# Patient Record
Sex: MALE | Race: WHITE | URBAN - METROPOLITAN AREA
[De-identification: names, ages, dates, MRNs, and addresses within clinical notes are randomized per-mention and may not be internally consistent; named-entity substitution may affect disease eponyms.]

---

## 2018-02-25 ENCOUNTER — APPOINTMENT (OUTPATIENT)
Dept: GENERAL RADIOLOGY | Age: 23
End: 2018-02-25
Attending: EMERGENCY MEDICINE
Payer: COMMERCIAL

## 2018-02-25 ENCOUNTER — HOSPITAL ENCOUNTER (EMERGENCY)
Age: 23
Discharge: HOME | End: 2018-02-25
Attending: EMERGENCY MEDICINE | Admitting: EMERGENCY MEDICINE
Payer: COMMERCIAL

## 2018-02-25 VITALS
WEIGHT: 188.49 LBS | HEIGHT: 74 IN | BODY MASS INDEX: 24.19 KG/M2 | RESPIRATION RATE: 18 BRPM | DIASTOLIC BLOOD PRESSURE: 56 MMHG | TEMPERATURE: 98.3 F | OXYGEN SATURATION: 97 % | HEART RATE: 65 BPM | SYSTOLIC BLOOD PRESSURE: 154 MMHG

## 2018-02-25 DIAGNOSIS — R07.9 ACUTE CHEST PAIN: Primary | ICD-10-CM

## 2018-02-25 LAB
ALBUMIN SERPL-MCNC: 4.5 G/DL (ref 3.5–5)
ALBUMIN/GLOB SERPL: 1.6 {RATIO} (ref 1.1–2.2)
ALP SERPL-CCNC: 73 U/L (ref 45–117)
ALT SERPL-CCNC: 59 U/L (ref 12–78)
ANION GAP SERPL CALC-SCNC: 7 MMOL/L (ref 5–15)
AST SERPL-CCNC: 45 U/L (ref 15–37)
BASOPHILS # BLD: 0 K/UL (ref 0–0.1)
BASOPHILS NFR BLD: 0 % (ref 0–1)
BILIRUB SERPL-MCNC: 0.4 MG/DL (ref 0.2–1)
BUN SERPL-MCNC: 17 MG/DL (ref 6–20)
BUN/CREAT SERPL: 16 (ref 12–20)
CALCIUM SERPL-MCNC: 9.2 MG/DL (ref 8.5–10.1)
CHLORIDE SERPL-SCNC: 102 MMOL/L (ref 97–108)
CO2 SERPL-SCNC: 30 MMOL/L (ref 21–32)
CREAT SERPL-MCNC: 1.07 MG/DL (ref 0.7–1.3)
D DIMER PPP FEU-MCNC: <0.17 MG/L FEU (ref 0–0.65)
DIFFERENTIAL METHOD BLD: NORMAL
EOSINOPHIL # BLD: 0.2 K/UL (ref 0–0.4)
EOSINOPHIL NFR BLD: 2 % (ref 0–7)
ERYTHROCYTE [DISTWIDTH] IN BLOOD BY AUTOMATED COUNT: 12.8 % (ref 11.5–14.5)
GLOBULIN SER CALC-MCNC: 2.9 G/DL (ref 2–4)
GLUCOSE SERPL-MCNC: 98 MG/DL (ref 65–100)
HCT VFR BLD AUTO: 45.8 % (ref 36.6–50.3)
HGB BLD-MCNC: 16.2 G/DL (ref 12.1–17)
LYMPHOCYTES # BLD: 2.8 K/UL
LYMPHOCYTES NFR BLD: 45 % (ref 12–49)
MCH RBC QN AUTO: 30.8 PG (ref 26–34)
MCHC RBC AUTO-ENTMCNC: 35.4 G/DL (ref 30–36.5)
MCV RBC AUTO: 87.1 FL (ref 80–99)
MONOCYTES # BLD: 0.8 K/UL (ref 0–1)
MONOCYTES NFR BLD: 13 % (ref 5–13)
NEUTS SEG # BLD: 2.6 K/UL (ref 1.8–8)
NEUTS SEG NFR BLD: 40 % (ref 32–75)
PLATELET # BLD AUTO: 200 K/UL (ref 150–400)
PMV BLD AUTO: 10 FL (ref 8.9–12.9)
POTASSIUM SERPL-SCNC: 3.7 MMOL/L (ref 3.5–5.1)
PROT SERPL-MCNC: 7.4 G/DL (ref 6.4–8.2)
RBC # BLD AUTO: 5.26 M/UL (ref 4.1–5.7)
SODIUM SERPL-SCNC: 139 MMOL/L (ref 136–145)
WBC # BLD AUTO: 6.4 K/UL (ref 4.1–11.1)
XXWBCSUS: 0

## 2018-02-25 PROCEDURE — 85025 COMPLETE CBC W/AUTO DIFF WBC: CPT | Performed by: EMERGENCY MEDICINE

## 2018-02-25 PROCEDURE — 85379 FIBRIN DEGRADATION QUANT: CPT | Performed by: EMERGENCY MEDICINE

## 2018-02-25 PROCEDURE — 36415 COLL VENOUS BLD VENIPUNCTURE: CPT | Performed by: EMERGENCY MEDICINE

## 2018-02-25 PROCEDURE — 80053 COMPREHEN METABOLIC PANEL: CPT | Performed by: EMERGENCY MEDICINE

## 2018-02-25 PROCEDURE — 99283 EMERGENCY DEPT VISIT LOW MDM: CPT

## 2018-02-25 PROCEDURE — 71046 X-RAY EXAM CHEST 2 VIEWS: CPT

## 2018-02-25 RX ORDER — CALCIUM CARBONATE 200(500)MG
1 TABLET,CHEWABLE ORAL DAILY
COMMUNITY
End: 2018-05-25

## 2018-02-25 RX ORDER — IBUPROFEN 200 MG
600 TABLET ORAL
COMMUNITY
End: 2018-02-25

## 2018-02-25 RX ORDER — IBUPROFEN 200 MG
600 TABLET ORAL
Status: SHIPPED | COMMUNITY
Start: 2018-02-25 | End: 2018-05-25

## 2018-02-25 RX ORDER — KETOROLAC TROMETHAMINE 30 MG/ML
30 INJECTION, SOLUTION INTRAMUSCULAR; INTRAVENOUS
Status: DISCONTINUED | OUTPATIENT
Start: 2018-02-25 | End: 2018-02-26 | Stop reason: HOSPADM

## 2018-02-26 NOTE — DISCHARGE INSTRUCTIONS
Musculoskeletal Chest Pain: Care Instructions  Your Care Instructions    Chest pain is not always a sign that something is wrong with your heart or that you have another serious problem. The doctor thinks your chest pain is caused by strained muscles or ligaments, inflamed chest cartilage, or another problem in your chest, rather than by your heart. You may need more tests to find the cause of your chest pain. Follow-up care is a key part of your treatment and safety. Be sure to make and go to all appointments, and call your doctor if you are having problems. It's also a good idea to know your test results and keep a list of the medicines you take. How can you care for yourself at home? · Take pain medicines exactly as directed. ¨ If the doctor gave you a prescription medicine for pain, take it as prescribed. ¨ If you are not taking a prescription pain medicine, ask your doctor if you can take an over-the-counter medicine. · Rest and protect the sore area. · Stop, change, or take a break from any activity that may be causing your pain or soreness. · Put ice or a cold pack on the sore area for 10 to 20 minutes at a time. Try to do this every 1 to 2 hours for the next 3 days (when you are awake) or until the swelling goes down. Put a thin cloth between the ice and your skin. · After 2 or 3 days, apply a heating pad set on low or a warm cloth to the area that hurts. Some doctors suggest that you go back and forth between hot and cold. · Do not wrap or tape your ribs for support. This may cause you to take smaller breaths, which could increase your risk of lung problems. · Mentholated creams such as Bengay or Icy Hot may soothe sore muscles. Follow the instructions on the package. · Follow your doctor's instructions for exercising. · Gentle stretching and massage may help you get better faster. Stretch slowly to the point just before pain begins, and hold the stretch for at least 15 to 30 seconds.  Do this 3 or 4 times a day. Stretch just after you have applied heat. · As your pain gets better, slowly return to your normal activities. Any increased pain may be a sign that you need to rest a while longer. When should you call for help? Call 911 anytime you think you may need emergency care. For example, call if:  ? · You have chest pain or pressure. This may occur with:  ¨ Sweating. ¨ Shortness of breath. ¨ Nausea or vomiting. ¨ Pain that spreads from the chest to the neck, jaw, or one or both shoulders or arms. ¨ Dizziness or lightheadedness. ¨ A fast or uneven pulse. After calling 911, chew 1 adult-strength aspirin. Wait for an ambulance. Do not try to drive yourself. ? · You have sudden chest pain and shortness of breath, or you cough up blood. ?Call your doctor now or seek immediate medical care if:  ? · You have any trouble breathing. ? · Your chest pain gets worse. ? · Your chest pain occurs consistently with exercise and is relieved by rest.   ? Watch closely for changes in your health, and be sure to contact your doctor if:  ? · Your chest pain does not get better after 1 week. Where can you learn more? Go to http://sophie-rebecca.info/. Enter V293 in the search box to learn more about \"Musculoskeletal Chest Pain: Care Instructions. \"  Current as of: March 20, 2017  Content Version: 11.4  © 7876-2960 [employer]. Care instructions adapted under license by [facility] (which disclaims liability or warranty for this information). If you have questions about a medical condition or this instruction, always ask your healthcare professional. [place] [address] any warranty or liability for your use of this information. Chest Pain: Care Instructions  Your Care Instructions    There are many things that can cause chest pain. Some are not serious and will get better on their own in a few days.  But some kinds of chest pain need more testing and treatment. Your doctor may have recommended a follow-up visit in the next 8 to 12 hours. If you are not getting better, you may need more tests or treatment. Even though your doctor has released you, you still need to watch for any problems. The doctor carefully checked you, but sometimes problems can develop later. If you have new symptoms or if your symptoms do not get better, get medical care right away. If you have worse or different chest pain or pressure that lasts more than 5 minutes or you passed out (lost consciousness), call 911 or seek other emergency help right away. A medical visit is only one step in your treatment. Even if you feel better, you still need to do what your doctor recommends, such as going to all suggested follow-up appointments and taking medicines exactly as directed. This will help you recover and help prevent future problems. How can you care for yourself at home? · Rest until you feel better. · Take your medicine exactly as prescribed. Call your doctor if you think you are having a problem with your medicine. · Do not drive after taking a prescription pain medicine. When should you call for help? Call 911 if:  ? · You passed out (lost consciousness). ? · You have severe difficulty breathing. ? · You have symptoms of a heart attack. These may include:  ¨ Chest pain or pressure, or a strange feeling in your chest.  ¨ Sweating. ¨ Shortness of breath. ¨ Nausea or vomiting. ¨ Pain, pressure, or a strange feeling in your back, neck, jaw, or upper belly or in one or both shoulders or arms. ¨ Lightheadedness or sudden weakness. ¨ A fast or irregular heartbeat. After you call 911, the  may tell you to chew 1 adult-strength or 2 to 4 low-dose aspirin. Wait for an ambulance. Do not try to drive yourself. ?Call your doctor today if:  ? · You have any trouble breathing. ? · Your chest pain gets worse.    ? · You are dizzy or lightheaded, or you feel like you may faint. ? · You are not getting better as expected. ? · You are having new or different chest pain. Where can you learn more? Go to http://sophie-rebecca.info/. Enter A120 in the search box to learn more about \"Chest Pain: Care Instructions. \"  Current as of: March 20, 2017  Content Version: 11.4  © 3236-2416 [employer]. Care instructions adapted under license by "[employer] (which disclaims liability or warranty for this information). If you have questions about a medical condition or this instruction, always ask your healthcare professional. [place] [address] any warranty or liability for your use of this information.

## 2018-02-26 NOTE — ED NOTES
AIDET communication provided and informed of purposeful rounding to include collaboration of entire care team; patient acknowledged understanding. Pt remains on continuous pulse ox and BP. Call bell within reach, will continue to monitor.

## 2018-02-26 NOTE — ED NOTES
Patient discharged home after receiving discharge instructions from MD.  Patient and mother voiced understanding and doesn't have any questions at this time. Patient in no distress at this time. Pt ambulated out of the ER with his mother.

## 2018-02-26 NOTE — ED NOTES
Pt refuses Toradol. States he has a really high pain tolerance and doesn't want any meds at this time. He will let this nurse know if he needs anything.

## 2018-02-26 NOTE — ED TRIAGE NOTES
Pain to right rib cage when breathing in. Happened initially after taking a deep breath. No cough or injury. No SOB. No recent sickness or cough. Mom at bedside.  advil and tums given per mom, which hasn't helped

## 2018-02-26 NOTE — ED PROVIDER NOTES
HPI Comments: Ninoska Green is a 26 yo M with no significant medical history who presents to the ED with pleuritic right sided chest pain. He states that about an hour prior to arrival, he was laying down and took a deep breath and suddenly felt a sharp pain in his right inferior lateral chest.  Since then he has continued to have pain with moderately deep inspiration. He took ibuprofen for the pain but it did not help. He denies shortness of breath. He denies leg swelling. History reviewed. No pertinent past medical history. History reviewed. No pertinent surgical history. History reviewed. No pertinent family history. Social History     Social History    Marital status: SINGLE     Spouse name: N/A    Number of children: N/A    Years of education: N/A     Occupational History    Not on file. Social History Main Topics    Smoking status: Current Some Day Smoker    Smokeless tobacco: Never Used    Alcohol use No    Drug use: Yes     Special: Marijuana, Cocaine, Other      Comment: LSD also. last THc was about 2 weeks ago    Sexual activity: Not on file     Other Topics Concern    Not on file     Social History Narrative    No narrative on file         ALLERGIES: Review of patient's allergies indicates no known allergies. Review of Systems   Constitutional: Negative for fever. HENT: Negative for sore throat. Eyes: Negative for visual disturbance. Respiratory: Negative for cough and shortness of breath. Cardiovascular: Positive for chest pain. Gastrointestinal: Negative for abdominal pain. Genitourinary: Negative for dysuria. Musculoskeletal: Negative for back pain. Skin: Negative for rash. Neurological: Negative for headaches.        Vitals:    02/25/18 2108 02/25/18 2130 02/25/18 2200   BP: 138/87 165/85 154/56   Pulse: 81 80 65   Resp: 17 16 18   Temp: 98.3 °F (36.8 °C)     SpO2: 96% 90% 97%   Weight: 85.5 kg (188 lb 7.9 oz)     Height: 6' 2\" (1.88 m) Physical Exam   Constitutional: He appears well-developed and well-nourished. No distress. HENT:   Head: Normocephalic and atraumatic. Mouth/Throat: Oropharynx is clear and moist.   Eyes: Conjunctivae and EOM are normal.   Neck: Normal range of motion and phonation normal.   Cardiovascular: Normal rate and intact distal pulses. Pulmonary/Chest: Effort normal and breath sounds normal. No respiratory distress. He has no wheezes. He has no rales. He exhibits no tenderness. Abdominal: He exhibits no distension. Musculoskeletal: Normal range of motion. He exhibits no tenderness. Neurological: He is alert. He is not disoriented. He exhibits normal muscle tone. Skin: Skin is warm and dry. Nursing note and vitals reviewed. [facility]      ED Course     9:49 PM  CXR reviewed and is normal.  No pneumothorax. Labs drawn, results pending.     Procedures

## 2018-05-25 ENCOUNTER — OFFICE VISIT (OUTPATIENT)
Dept: CARDIOLOGY CLINIC | Age: 23
End: 2018-05-25

## 2018-05-25 VITALS
HEIGHT: 74 IN | BODY MASS INDEX: 23.66 KG/M2 | WEIGHT: 184.4 LBS | SYSTOLIC BLOOD PRESSURE: 118 MMHG | OXYGEN SATURATION: 99 % | DIASTOLIC BLOOD PRESSURE: 68 MMHG | HEART RATE: 51 BPM | RESPIRATION RATE: 18 BRPM

## 2018-05-25 DIAGNOSIS — I45.10 RIGHT BUNDLE BRANCH BLOCK: ICD-10-CM

## 2018-05-25 DIAGNOSIS — R00.1 SINUS BRADYCARDIA: ICD-10-CM

## 2018-05-25 DIAGNOSIS — R07.9 CHEST PAIN, UNSPECIFIED TYPE: Primary | ICD-10-CM

## 2018-05-25 NOTE — PROGRESS NOTES
Juliana Watson MD    [id number] [facility] [place], [address] [facility]    Office [phone],[facility] [phone]  Pager [phone]    Rasheeda Dempsey is a 21 y.o. male referred for evaluation of chest pain Consult requested by Dr Logan/ PF. Primary care physician:  None      Chief complaint:  Rasheeda Dempsey is a 21 y.o. male who complains of   Chief Complaint   Patient presents with    New Patient    Chest Pain (Angina)       Assessment:    Atypical chest pain  Abnormal EKG-sinus bradycardia/right bundle branch block      Plan:     Patient symptoms occurred at peak exercise-running at 12 mph on the treadmill with a heart rate in the 190s-200s. Once he stopped running, his pain resolved. He is pursuing rigorous training in the gym which includes cardiovascular/interval training of muscles. Will check echocardiogram.  If echo is normal, no further workup is needed. Patient is advised to monitor his symptoms . Discussed with patient about maximum predicted heart rate for his age. Patient will see me on a as needed basis if his echocardiogram is normal.    Patient understands the plan. All questions were answered to the patient's satisfaction. Medication Side Effects and Warnings were discussed with patient: yes  Patient Labs were reviewed and or requested:  yes  Patient Past Records were reviewed and or requested: yes    I appreciate the opportunity to be involved in Mr. Esquivel. Please see note below for details. Please do not hesitate to contact us with questions or concerns. Juliana Watson MD    Cardiac Testing/ Procedures: A. Cardiac Cath/PCI:    B.ECHO/TORI:    C.StressNuclear/Stress ECHO/Stress test:    D.Vascular:    E. EP:    F. Miscellaneous:    History of present illness:    Patient is a 80-year-old  male who had one episode of transient chest tightness at peak exercise. Patient symptoms occurred at peak exercise-running at 12 mph on the treadmill with a heart rate in the 190s-200s. Once he stopped running, his pain resolved. He is pursuing rigorous training in the gym which includes cardiovascular/interval training of muscles. 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 He works at [employer]. Past Medical History:   Diagnosis Date    Exercise-induced asthma       No past surgical history on file. No family history on file. Social History   Substance Use Topics    Smoking status: Former Smoker    Smokeless tobacco: Never Used    Alcohol use Yes     Has used cocaine/marijuana at parties previously but does not do it frequently. Medications before admission:    Current Outpatient Prescriptions   Medication Sig Dispense    BIOTIN PO Take  by mouth as needed. No current facility-administered medications for this visit. Review of Systems:  (bold if positive, if negative)    Gen:  Eyes:  ENT:  CVS:  Pulm:  GI:    :    MS:  Skin:  Psych:  Endo:    Hem:  Renal:    Neuro:        Physical Exam:  Visit Vitals    /68 (BP 1 Location: Left arm)    Pulse (!) 51    Resp 18    Ht 6' 2\" (1.88 m)    Wt 184 lb 6.4 oz (83.6 kg)    SpO2 99%    BMI 23.68 kg/m2          Gen: Well-developed, well-nourished, in no acute distress  HEENT:  Pink conjunctivae, hearing intact to voice, moist mucous membranes  Neck: Supple,No JVD, No Carotid Bruit, Thyroid- non tender  Resp: No accessory muscle use, Clear breath sounds, No rales or rhonchi  Card: Regular Rate,Rythm,Normal S1, S2, No murmurs, rubs or gallop. No thrills.    Abd:  Soft, non-tender, non-distended, normoactive bowel sounds are present,   MSK: No cyanosis or clubbing  Skin: No rashes or ulcers  Neuro:   moving all four extremities, no focal deficit, follows commands appropriately  Psych:  Good insight, oriented to person, place and time, alert, Nml Affect  LE: No edema  Vascular: Radial pulses 2+ and symmetric        EKG: Sinus bradycardia, right bundle branch block      Cxray:    LABS:    No results for input(s): CPK, TROIQ in the last 72 hours.     No lab exists for component: CKQMB, CPKMB    Lab Results   Component Value Date/Time    WBC 6.4 02/25/2018 09:42 PM    HGB 16.2 02/25/2018 09:42 PM    HCT 45.8 02/25/2018 09:42 PM    PLATELET 918 99/50/1975 09:42 PM    MCV 87.1 02/25/2018 09:42 PM     Lab Results   Component Value Date/Time    Sodium 139 02/25/2018 09:42 PM    Potassium 3.7 02/25/2018 09:42 PM    Chloride 102 02/25/2018 09:42 PM    CO2 30 02/25/2018 09:42 PM    Anion gap 7 02/25/2018 09:42 PM    Glucose 98 02/25/2018 09:42 PM    BUN 17 02/25/2018 09:42 PM    Creatinine 1.07 02/25/2018 09:42 PM    BUN/Creatinine ratio 16 02/25/2018 09:42 PM    GFR est AA >60 02/25/2018 09:42 PM    GFR est non-AA >60 02/25/2018 09:42 PM    Calcium 9.2 02/25/2018 09:42 PM             Alyse Mason MD

## 2018-05-25 NOTE — MR AVS SNAPSHOT
[address] [facility] [address] [facility] 
[phone] Patient: Jacki Courser MRN: [id number] :1995 Visit Information Date & Time Provider Department Dept. Phone Encounter #  
 2018  1:40 PM Jackie Dang MD [facility] Maribel Jaffe [phone] [id number] Your Appointments 2018  8:00 AM  
ECHO CARDIOGRAMS 2D with ECHOSHAMA  
[facility] (HERLINDA SCHEDULING) Appt Note: echo sll [address] [facility] [address] [facility]  
[address] [place] [address] [address] Upcoming Health Maintenance Date Due Pneumococcal 19-64 Medium Risk (1 of 1 - PPSV23) 2014 DTaP/Tdap/Td series (1 - Tdap) 2016 Influenza Age 5 to Adult 2018 Allergies as of 2018  Review Complete On: 2018 By: Elen Adame No Known Allergies Current Immunizations  Never Reviewed No immunizations on file. Not reviewed this visit You Were Diagnosed With   
  
 Codes Comments Chest pain, unspecified type    -  Primary ICD-10-CM: R07.9 ICD-9-CM: 786.50 Vitals BP Pulse Resp Height(growth percentile) Weight(growth percentile) SpO2  
 118/68 (BP 1 Location: Left arm) (!) 51 18 6' 2\" (1.88 m) 184 lb 6.4 oz (83.6 kg) 99% BMI Smoking Status 23.68 kg/m2 Former Smoker Vitals History BMI and BSA Data Body Mass Index Body Surface Area  
 23.68 kg/m 2 2.09 m 2 Your Updated Medication List  
  
   
This list is accurate as of 18  2:44 PM.  Always use your most recent med list.  
  
  
  
  
 BIOTIN PO Take  by mouth as needed. We Performed the Following AMB POC EKG ROUTINE W/ 12 LEADS, INTER & REP [73114 CPT(R)] Introducing [facility]! Dear Ainsley Seed: Thank you for requesting a TrueL[employer] account. Our records indicate that you already have an active TrueLens account. You can access your account anytime at [email or web address] Did you know that you can access your hospital and ER discharge instructions at any time in TrueLens? You can also review all of your test results from your hospital stay or ER visit. Additional Information If you have questions, please visit the Frequently Asked Questions section of the TrueLens website at [email or web address] Remember, TrueLens is NOT to be used for urgent needs. For medical emergencies, dial 911. Now available from your iPhone and Android! Please provide this summary of care documentation to your next provider. Your primary care clinician is listed as NONE. If you have any questions after today's visit, please call [phone].

## 2018-11-02 ENCOUNTER — HOSPITAL ENCOUNTER (OUTPATIENT)
Dept: MRI IMAGING | Age: 23
Discharge: HOME | End: 2018-11-02
Payer: COMMERCIAL

## 2018-11-02 DIAGNOSIS — S63.511A SPRAIN OF CARPAL JOINT OF RIGHT WRIST, INITIAL ENCOUNTER: ICD-10-CM

## 2018-11-02 PROCEDURE — 73221 MRI JOINT UPR EXTREM W/O DYE: CPT

## 2021-01-03 NOTE — PROGRESS NOTES
Assessment and Plan   Diagnoses and all orders for this visit:    1. Severe episode of recurrent major depressive disorder, without psychotic features ([facility]  -     escitalopram oxalate (LEXAPRO) 10 mg tablet; Take 1 Tab by mouth daily. 2. Anxiety  -     TSH 3RD GENERATION; Future  -     T4, FREE; Future  Reports severe depression symptoms associated with anxiety. Has also noted increased paranoia about \"literally everything. \"  Has thoughts about people talking about him behind his back or people thinking he is a suspicious person. Reports he had his friends have a \"bad history\" with law enforcement and is suspicious of undercover . Reports that his \"thoughts are illogical because there is no concrete evidence. \"  Reports having a negative view of the world. Denies any visual hallucinations. Reports a history of auditory hallucinations but thinks these may be related to drug use in the past which includes cocaine, marijuana, LSD, mushrooms. He also previously drank 1215 drinks a week of alcohol. He has not had any alcohol or recreational drugs since October 2019. Reports that his depressive symptoms have gotten worse after stopping substance use. Denies any episodes of [place] but reports his mom says he is \"up-and-down\" a lot. Patient interested in psychiatry counseling referral.  PHQ-9 score is 12. Recommend starting Lexapro for depression and anxiety. List of places provided to patient for psychiatry and counseling. Also recommend calling his insurance to get a list of covered providers. 3. Pain in left testicle  -     REFERRAL TO UROLOGY  Was previously scheduled to have surgery for varicose vein of his left scrotum last year. However, this did not occur due to his moved from [address] [place] [address]. Occasionally still has pain in his left scrotum. Referral provided for urology    4. Attention deficit hyperactivity disorder (ADHD), unspecified ADHD type  Diagnosed as a child.   Not currently on any medications. Continue to monitor    5. Exercise-induced asthma  Uses albuterol as needed before exercising. Well-controlled    6. Routine adult health maintenance  -     CBC WITH AUTOMATED DIFF; Future  -     HEMOGLOBIN A1C WITH EAG; Future  -     METABOLIC PANEL, COMPREHENSIVE; Future  -     LIPID PANEL; Future  Thinks that he already got the flu shot and Tdap vaccine. He will check with his previous provider in [place] [address], risks, possible drug interactions, and side effects of all new medications were reviewed with the patient. Pt verbalized understanding. Return to clinic: 4 to 6 weeks for medication follow-up    An electronic signature was used to authenticate this note. David Cramer MD  [facility]  1/4/2021    Future Appointments   Date Time Provider Abad Strong   3/1/3824  2:05 AM Glen Mendieta MD [facility] BS AMB        History of Present Illness   Chief Complaint   Establish care    Kwasi Reyes is a 22 y.o. male         Review of Systems   Constitutional: Negative for chills and fever. HENT: Negative for hearing loss. Eyes: Negative for blurred vision. Respiratory: Negative for shortness of breath. Cardiovascular: Negative for chest pain. Gastrointestinal: Negative for abdominal pain, blood in stool, constipation, diarrhea, melena, nausea and vomiting. Genitourinary: Negative for dysuria and hematuria. Musculoskeletal: Negative for joint pain. Skin: Negative for rash. Neurological: Negative for headaches. Past Medical History     Allergies   Allergen Reactions    Oxycodone Nausea and Vomiting        Current Outpatient Medications   Medication Sig    escitalopram oxalate (LEXAPRO) 10 mg tablet Take 1 Tab by mouth daily.  albuterol (PROVENTIL HFA, VENTOLIN HFA, PROAIR HFA) 90 mcg/actuation inhaler Take  by inhalation. No current facility-administered medications for this visit. Patient Active Problem List   Diagnosis Code    Severe episode of recurrent major depressive disorder, without psychotic features ([facility] F33.2    Anxiety F41.9    ADHD F90.9    Exercise-induced asthma J45.990     Past Surgical History:   Procedure Laterality Date    HX ORTHOPAEDIC      right wrist repair (tendon or ligament?)      Social History     Tobacco Use    Smoking status: Former Smoker    Smokeless tobacco: Never Used    Tobacco comment: 1ppd from age [age], quit in 2020   Substance Use Topics    Alcohol use: Not Currently     Comment: none since 10/2019; 12-15 drinks/week previously      Family History   Family history unknown: Yes        Physical Exam   Vitals:       Visit Vitals  /85 (BP 1 Location: Left arm, BP Patient Position: Sitting)   Pulse 71   Temp 98.3 °F (36.8 °C) (Oral)   Resp 16   Ht 6' 2\" (1.88 m)   Wt 188 lb (85.3 kg)   SpO2 95%   BMI 24.14 kg/m²        Physical Exam  Constitutional:       General: He is not in acute distress. Appearance: He is well-developed. HENT:      Right Ear: Tympanic membrane, ear canal and external ear normal.      Left Ear: Tympanic membrane, ear canal and external ear normal.   Eyes:      Extraocular Movements: Extraocular movements intact. Conjunctiva/sclera: Conjunctivae normal.   Neck:      Musculoskeletal: Neck supple. Cardiovascular:      Rate and Rhythm: Normal rate and regular rhythm. Pulses: Normal pulses. Heart sounds: No murmur. No friction rub. No gallop. Pulmonary:      Effort: No respiratory distress. Breath sounds: No wheezing, rhonchi or rales. Abdominal:      General: Bowel sounds are normal. There is no distension. Palpations: Abdomen is soft. There is no hepatomegaly, splenomegaly or mass. Tenderness: There is no abdominal tenderness. There is no guarding. Skin:     General: Skin is warm. Findings: No rash. Neurological:      Mental Status: He is alert.    Psychiatric: Mood and Affect: Mood normal.         Behavior: Behavior normal.         Thought Content:  Thought content normal.         Judgment: Judgment normal.

## 2021-01-04 ENCOUNTER — OFFICE VISIT (OUTPATIENT)
Dept: INTERNAL MEDICINE CLINIC | Age: 26
End: 2021-01-04
Payer: MEDICAID

## 2021-01-04 VITALS
WEIGHT: 188 LBS | TEMPERATURE: 98.3 F | BODY MASS INDEX: 24.13 KG/M2 | HEIGHT: 74 IN | HEART RATE: 71 BPM | OXYGEN SATURATION: 95 % | RESPIRATION RATE: 16 BRPM | SYSTOLIC BLOOD PRESSURE: 126 MMHG | DIASTOLIC BLOOD PRESSURE: 85 MMHG

## 2021-01-04 DIAGNOSIS — F33.2 SEVERE EPISODE OF RECURRENT MAJOR DEPRESSIVE DISORDER, WITHOUT PSYCHOTIC FEATURES (HCC): Primary | ICD-10-CM

## 2021-01-04 DIAGNOSIS — J45.990 EXERCISE-INDUCED ASTHMA: ICD-10-CM

## 2021-01-04 DIAGNOSIS — F41.9 ANXIETY: ICD-10-CM

## 2021-01-04 DIAGNOSIS — Z00.00 ROUTINE ADULT HEALTH MAINTENANCE: ICD-10-CM

## 2021-01-04 DIAGNOSIS — F90.9 ATTENTION DEFICIT HYPERACTIVITY DISORDER (ADHD), UNSPECIFIED ADHD TYPE: ICD-10-CM

## 2021-01-04 DIAGNOSIS — N50.812 PAIN IN LEFT TESTICLE: ICD-10-CM

## 2021-01-04 PROCEDURE — 99204 OFFICE O/P NEW MOD 45 MIN: CPT | Performed by: INTERNAL MEDICINE

## 2021-01-04 RX ORDER — ESCITALOPRAM OXALATE 10 MG/1
10 TABLET ORAL DAILY
Qty: 30 TAB | Refills: 1 | OUTPATIENT
Start: 2021-01-04 | End: 2021-01-20

## 2021-01-04 RX ORDER — ALBUTEROL SULFATE 90 UG/1
2 AEROSOL, METERED RESPIRATORY (INHALATION)
COMMUNITY
End: 2021-07-06 | Stop reason: SDUPTHER

## 2021-01-04 NOTE — PROGRESS NOTES
Flower Ruggiero is a 22 y.o. male    Chief Complaint   Patient presents with   [address]     1. Have you been to the ER, urgent care clinic since your last visit? Hospitalized since your last visit? No      2. Have you seen or consulted any other health care providers outside of the [address] [place] [address] since your last visit? Include any pap smears or colon screening.  No

## 2021-01-04 NOTE — PATIENT INSTRUCTIONS
[facility] [address] [address] [facility] SPECIALTY \[facility]\"" Group Yuri Humphreys 8 [facility] [phone] [address] [facility] [address] [facility] 
[phone] [facility] [address] [phone] [address] [facility], 108 [facility] [phone] [facility] Abdulkadir Boudreaux MD 
53 [facility] [address] [place] [phone] [address] Jim Face, DO 
1000 Anam Arellano [facility] 
[phone]

## 2021-01-05 LAB
ALBUMIN SERPL-MCNC: 4.7 G/DL (ref 3.5–5)
ALBUMIN/GLOB SERPL: 1.6 {RATIO} (ref 1.1–2.2)
ALP SERPL-CCNC: 76 U/L (ref 45–117)
ALT SERPL-CCNC: 37 U/L (ref 12–78)
ANION GAP SERPL CALC-SCNC: 6 MMOL/L (ref 5–15)
AST SERPL-CCNC: 21 U/L (ref 15–37)
BASOPHILS # BLD: 0 K/UL (ref 0–0.1)
BASOPHILS NFR BLD: 1 % (ref 0–1)
BILIRUB SERPL-MCNC: 0.7 MG/DL (ref 0.2–1)
BUN SERPL-MCNC: 18 MG/DL (ref 6–20)
BUN/CREAT SERPL: 16 (ref 12–20)
CALCIUM SERPL-MCNC: 9 MG/DL (ref 8.5–10.1)
CHLORIDE SERPL-SCNC: 106 MMOL/L (ref 97–108)
CHOLEST SERPL-MCNC: 200 MG/DL
CO2 SERPL-SCNC: 29 MMOL/L (ref 21–32)
CREAT SERPL-MCNC: 1.13 MG/DL (ref 0.7–1.3)
DIFFERENTIAL METHOD BLD: NORMAL
EOSINOPHIL # BLD: 0.2 K/UL (ref 0–0.4)
EOSINOPHIL NFR BLD: 3 % (ref 0–7)
ERYTHROCYTE [DISTWIDTH] IN BLOOD BY AUTOMATED COUNT: 12.1 % (ref 11.5–14.5)
EST. AVERAGE GLUCOSE BLD GHB EST-MCNC: 97 MG/DL
GLOBULIN SER CALC-MCNC: 2.9 G/DL (ref 2–4)
GLUCOSE SERPL-MCNC: 88 MG/DL (ref 65–100)
HBA1C MFR BLD: 5 % (ref 4–5.6)
HCT VFR BLD AUTO: 48.6 % (ref 36.6–50.3)
HDLC SERPL-MCNC: 55 MG/DL
HDLC SERPL: 3.6 {RATIO} (ref 0–5)
HGB BLD-MCNC: 16.4 G/DL (ref 12.1–17)
IMM GRANULOCYTES # BLD AUTO: 0 K/UL (ref 0–0.04)
IMM GRANULOCYTES NFR BLD AUTO: 0 % (ref 0–0.5)
LDLC SERPL CALC-MCNC: 135.6 MG/DL (ref 0–100)
LIPID PROFILE,FLP: ABNORMAL
LYMPHOCYTES # BLD: 1.9 K/UL (ref 0.8–3.5)
LYMPHOCYTES NFR BLD: 36 % (ref 12–49)
MCH RBC QN AUTO: 30.4 PG (ref 26–34)
MCHC RBC AUTO-ENTMCNC: 33.7 G/DL (ref 30–36.5)
MCV RBC AUTO: 90 FL (ref 80–99)
MONOCYTES # BLD: 0.5 K/UL (ref 0–1)
MONOCYTES NFR BLD: 9 % (ref 5–13)
NEUTS SEG # BLD: 2.7 K/UL (ref 1.8–8)
NEUTS SEG NFR BLD: 51 % (ref 32–75)
NRBC # BLD: 0 K/UL (ref 0–0.01)
NRBC BLD-RTO: 0 PER 100 WBC
PLATELET # BLD AUTO: 237 K/UL (ref 150–400)
PMV BLD AUTO: 10.9 FL (ref 8.9–12.9)
POTASSIUM SERPL-SCNC: 4.7 MMOL/L (ref 3.5–5.1)
PROT SERPL-MCNC: 7.6 G/DL (ref 6.4–8.2)
RBC # BLD AUTO: 5.4 M/UL (ref 4.1–5.7)
SODIUM SERPL-SCNC: 141 MMOL/L (ref 136–145)
T4 FREE SERPL-MCNC: 1.3 NG/DL (ref 0.8–1.5)
TRIGL SERPL-MCNC: 47 MG/DL (ref ?–150)
TSH SERPL DL<=0.05 MIU/L-ACNC: 2.99 UIU/ML (ref 0.36–3.74)
VLDLC SERPL CALC-MCNC: 9.4 MG/DL
WBC # BLD AUTO: 5.3 K/UL (ref 4.1–11.1)

## 2021-01-05 NOTE — PROGRESS NOTES
ExpertBeacont message sent. Thyroid studies normal.  . CMP normal.  A1c normal.  CBC normal  Monitor lipid.

## 2021-01-20 ENCOUNTER — HOSPITAL ENCOUNTER (EMERGENCY)
Age: 26
Discharge: HOME | End: 2021-01-20
Attending: STUDENT IN AN ORGANIZED HEALTH CARE EDUCATION/TRAINING PROGRAM
Payer: MEDICAID

## 2021-01-20 VITALS
TEMPERATURE: 98.4 F | OXYGEN SATURATION: 96 % | RESPIRATION RATE: 16 BRPM | DIASTOLIC BLOOD PRESSURE: 65 MMHG | SYSTOLIC BLOOD PRESSURE: 119 MMHG | HEART RATE: 52 BPM

## 2021-01-20 DIAGNOSIS — R53.83 FATIGUE, UNSPECIFIED TYPE: ICD-10-CM

## 2021-01-20 DIAGNOSIS — F41.8 ANXIETY ASSOCIATED WITH DEPRESSION: Primary | ICD-10-CM

## 2021-01-20 LAB
ALBUMIN SERPL-MCNC: 4.1 G/DL (ref 3.5–5)
ALBUMIN/GLOB SERPL: 1.3 {RATIO} (ref 1.1–2.2)
ALP SERPL-CCNC: 76 U/L (ref 45–117)
ALT SERPL-CCNC: 35 U/L (ref 12–78)
ANION GAP SERPL CALC-SCNC: 5 MMOL/L (ref 5–15)
APPEARANCE UR: CLEAR
AST SERPL-CCNC: 17 U/L (ref 15–37)
BASOPHILS # BLD: 0 K/UL (ref 0–0.1)
BASOPHILS NFR BLD: 1 % (ref 0–1)
BILIRUB SERPL-MCNC: 0.3 MG/DL (ref 0.2–1)
BILIRUB UR QL: NEGATIVE
BUN SERPL-MCNC: 19 MG/DL (ref 6–20)
BUN/CREAT SERPL: 15 (ref 12–20)
CALCIUM SERPL-MCNC: 8.8 MG/DL (ref 8.5–10.1)
CHLORIDE SERPL-SCNC: 104 MMOL/L (ref 97–108)
CO2 SERPL-SCNC: 31 MMOL/L (ref 21–32)
COLOR UR: NORMAL
CREAT SERPL-MCNC: 1.29 MG/DL (ref 0.7–1.3)
DIFFERENTIAL METHOD BLD: ABNORMAL
EOSINOPHIL # BLD: 0.4 K/UL (ref 0–0.4)
EOSINOPHIL NFR BLD: 6 % (ref 0–7)
ERYTHROCYTE [DISTWIDTH] IN BLOOD BY AUTOMATED COUNT: 11.9 % (ref 11.5–14.5)
GLOBULIN SER CALC-MCNC: 3.1 G/DL (ref 2–4)
GLUCOSE SERPL-MCNC: 96 MG/DL (ref 65–100)
GLUCOSE UR STRIP.AUTO-MCNC: NEGATIVE MG/DL
HCT VFR BLD AUTO: 48.2 % (ref 36.6–50.3)
HGB BLD-MCNC: 16.2 G/DL (ref 12.1–17)
HGB UR QL STRIP: NEGATIVE
IMM GRANULOCYTES # BLD AUTO: 0 K/UL (ref 0–0.04)
IMM GRANULOCYTES NFR BLD AUTO: 0 % (ref 0–0.5)
KETONES UR QL STRIP.AUTO: NEGATIVE MG/DL
LEUKOCYTE ESTERASE UR QL STRIP.AUTO: NEGATIVE
LYMPHOCYTES # BLD: 2.9 K/UL (ref 0.8–3.5)
LYMPHOCYTES NFR BLD: 52 % (ref 12–49)
MAGNESIUM SERPL-MCNC: 2.1 MG/DL (ref 1.6–2.4)
MCH RBC QN AUTO: 29.9 PG (ref 26–34)
MCHC RBC AUTO-ENTMCNC: 33.6 G/DL (ref 30–36.5)
MCV RBC AUTO: 89.1 FL (ref 80–99)
MONOCYTES # BLD: 0.6 K/UL (ref 0–1)
MONOCYTES NFR BLD: 10 % (ref 5–13)
NEUTS SEG # BLD: 1.8 K/UL (ref 1.8–8)
NEUTS SEG NFR BLD: 32 % (ref 32–75)
NITRITE UR QL STRIP.AUTO: NEGATIVE
NRBC # BLD: 0 K/UL (ref 0–0.01)
NRBC BLD-RTO: 0 PER 100 WBC
PH UR STRIP: 7 [PH] (ref 5–8)
PLATELET # BLD AUTO: 202 K/UL (ref 150–400)
PMV BLD AUTO: 10.6 FL (ref 8.9–12.9)
POTASSIUM SERPL-SCNC: 4.3 MMOL/L (ref 3.5–5.1)
PROT SERPL-MCNC: 7.2 G/DL (ref 6.4–8.2)
PROT UR STRIP-MCNC: NEGATIVE MG/DL
RBC # BLD AUTO: 5.41 M/UL (ref 4.1–5.7)
SODIUM SERPL-SCNC: 140 MMOL/L (ref 136–145)
SP GR UR REFRACTOMETRY: 1.02 (ref 1–1.03)
UROBILINOGEN UR QL STRIP.AUTO: 0.2 EU/DL (ref 0.2–1)
WBC # BLD AUTO: 5.7 K/UL (ref 4.1–11.1)

## 2021-01-20 PROCEDURE — 83735 ASSAY OF MAGNESIUM: CPT

## 2021-01-20 PROCEDURE — 80053 COMPREHEN METABOLIC PANEL: CPT

## 2021-01-20 PROCEDURE — 36415 COLL VENOUS BLD VENIPUNCTURE: CPT

## 2021-01-20 PROCEDURE — 85025 COMPLETE CBC W/AUTO DIFF WBC: CPT

## 2021-01-20 PROCEDURE — 81003 URINALYSIS AUTO W/O SCOPE: CPT

## 2021-01-20 PROCEDURE — 99284 EMERGENCY DEPT VISIT MOD MDM: CPT

## 2021-01-20 NOTE — ED PROVIDER NOTES
The patient is a 49-year-old male history of depression/anxiety, started on Lexapro 2 weeks ago by his PCP, here today with fatigue and anxiety. Patient states for the past day or so he has had increasing fatigue. Other than the Lexapro he has had no new medications, although he mentions he took melatonin last night. He states that today he took his dose of Lexapro around noon and started feeling worse. Reports progressive fatigue as well as anxiety. He does note that he is got depression, potentially worsening since starting the medication, but denies any suicidal or homicidal ideations. Patient states that he has no energy or drive to do anything. He sleeps 13 to 14 hours a day. He has been eating and drinking. Denies any pain. No dizziness or focal weakness. No fevers or chills. No cough or shortness of breath. No urinary complaints. He was given resources by his PCP for psychiatry however they have been backlog with appointments therefore he is supposed to call them back tomorrow. He called the nursing hotline on his insurance card today because of the way he felt and they advised him to come to the emergency department.            Past Medical History:   Diagnosis Date    ADHD     Exercise-induced asthma     Psychiatric disorder     , depression anxiety       Past Surgical History:   Procedure Laterality Date    HX ORTHOPAEDIC      right wrist repair (tendon or ligament?)         Family History:   Family history unknown: Yes       Social History     Socioeconomic History    Marital status: SINGLE     Spouse name: Not on file    Number of children: Not on file    Years of education: Not on file    Highest education level: Not on file   Occupational History    Not on file   Social Needs    Financial resource strain: Not on file    Food insecurity     Worry: Not on file     Inability: Not on file    Transportation needs     Medical: Not on file     Non-medical: Not on file   Tobacco Use  Smoking status: Former Smoker    Smokeless tobacco: Never Used    Tobacco comment: 1ppd from age [age], quit in 2020   Substance and Sexual Activity    Alcohol use: Not Currently     Comment: none since 10/2019; 12-15 drinks/week previously    Drug use: Yes     Types: Marijuana, Cocaine, Other     Comment: LSD also, mushrooms, none since 10/2019    Sexual activity: Not Currently     Partners: Female   Lifestyle    Physical activity     Days per week: Not on file     Minutes per session: Not on file    Stress: Not on file   Relationships    Social connections     Talks on phone: Not on file     Gets together: Not on file     Attends Restorationist service: Not on file     Active member of club or organization: Not on file     Attends meetings of clubs or organizations: Not on file     Relationship status: Not on file    Intimate partner violence     Fear of current or ex partner: Not on file     Emotionally abused: Not on file     Physically abused: Not on file     Forced sexual activity: Not on file   Other Topics Concern    Not on file   Social History Narrative    Not on file         ALLERGIES: Oxycodone    Review of Systems   Constitutional: Positive for fatigue. Negative for chills and fever. HENT: Negative for congestion and sore throat. Eyes: Negative for pain and redness. Respiratory: Negative for cough and shortness of breath. Cardiovascular: Negative for chest pain and palpitations. Gastrointestinal: Negative for abdominal pain, diarrhea, nausea and vomiting. Genitourinary: Negative for frequency and hematuria. Musculoskeletal: Negative for back pain and neck pain. Skin: Negative for rash and wound. Neurological: Negative for dizziness and headaches. Hematological: Does not bruise/bleed easily. Psychiatric/Behavioral: Positive for dysphoric mood. Negative for suicidal ideas. The patient is nervous/anxious.         Vitals:    01/20/21 1703   BP: 139/72   Pulse: (!) 59   Resp: 16   Temp: 98.4 °F (36.9 °C)   SpO2: 96%   :         Physical Exam  Vitals signs and nursing note reviewed. Constitutional:       General: He is not in acute distress. Appearance: He is well-developed. HENT:      Head: Normocephalic and atraumatic. Eyes:      Conjunctiva/sclera: Conjunctivae normal.      Pupils: Pupils are equal, round, and reactive to light. Neck:      Musculoskeletal: Normal range of motion and neck supple. Cardiovascular:      Rate and Rhythm: Normal rate and regular rhythm. Heart sounds: Normal heart sounds. No murmur. No friction rub. No gallop. Pulmonary:      Effort: Pulmonary effort is normal. No respiratory distress. Breath sounds: Normal breath sounds. No wheezing or rales. Abdominal:      General: Bowel sounds are normal. There is no distension. Palpations: Abdomen is soft. Tenderness: There is no abdominal tenderness. There is no guarding or rebound. Musculoskeletal: Normal range of motion. Comments: No muscle rigidity   Skin:     General: Skin is warm and dry. Capillary Refill: Capillary refill takes less than 2 seconds. Findings: No rash. Neurological:      Mental Status: He is alert and oriented to person, place, and time. Psychiatric:      Comments: Very flat/withdrawn and depressed appearing  No active SI or HI  Does not appear internally stimulated         MDM:     22 y.o. male here with fatigue/anxiety and worsening depression despite starting lexapro 2 weeks ago. Is supposed to d/w psych over phone tomorrow but felt so weak today that he wanted to get evaluated. Patient not having active SI/HI. I have consulted bsmart to see the patient due to worsening depression. His labs overall look ok--no significant electrolyte abnormality, anemia. He has no pain/sob/cough/fever to suggest infection such as covid. He had his thyroid levels checked 2 weeks ago and they were ok.   I will have bsmart see the patient and then likely dc the patient home. 7:07 PM  Change of shift. Care of patient signed over to Dr. Pretty Bhardwaj. Bedside handoff complete. Awaiting BSMRT.

## 2021-01-20 NOTE — ED TRIAGE NOTES
Pt ambulated to the treatment area with a steady gait. Pt states \"I started Lexapro 2 weeks ago for anxiety and depression today when I woke up I felt very fatigued and anxious. I took the Lexapro around 12noon and I felt worse after that. Im not sure if its related but I still feel very fatigued and anxious. \" Pt appears in no distress at this time.

## 2021-01-20 NOTE — ED NOTES
Dr Yevgeniy Hu is talking on the phone to [facility] from [address] [place] [address] about care for this patient.

## 2021-01-21 ENCOUNTER — TELEPHONE (OUTPATIENT)
Dept: INTERNAL MEDICINE CLINIC | Age: 26
End: 2021-01-21

## 2021-01-21 ENCOUNTER — PATIENT OUTREACH (OUTPATIENT)
Dept: CASE MANAGEMENT | Age: 26
End: 2021-01-21

## 2021-01-21 NOTE — ED NOTES
Bedside shift change report given to [facility], RN  (oncoming nurse) by TIAGO RN  (offgoing nurse). Report included the following information SBAR.

## 2021-01-21 NOTE — TELEPHONE ENCOUNTER
----- Message from Live Shirley sent at 1/21/2021  1:00 PM EST -----  Regarding: Telephone  General Message/Vendor Calls    Caller's first and last name: Blake Foreman       Reason for call: [facility]  fatugued, anxious, almost to the point that he could move, the ER doctor recommended  he stops taking the Lexopro so he stopped taking the meds       Callback required yes/no and why: Yes if you all need to speak with him.        Best contact number(s):  [phone]      Details to clarify the request: n/a      Live Shirley

## 2021-01-21 NOTE — TELEPHONE ENCOUNTER
Patient called to report being seen in ER for anxiety, depression, & fatigue. PSR scheduled appointment with PCP 1/22/2021 at 11:30AM. Patient request sooner appointment with PCP. If PCP is able to accomodates patients request please call patient to advise.      [phone]
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
## 2021-01-21 NOTE — ACP (ADVANCE CARE PLANNING)
Advance Care Planning:   Does patient have an Advance Directive: currently not on file; education provided  No changes to his medical agent-currently lists his mother Lelia Franco. no lymphadenopathy note

## 2021-01-21 NOTE — ED NOTES
Bedside shift change report given to [facility] RN (oncoming nurse) by Lisbeth grimm. Curtis Jeronimo RN (offgoing nurse). Report included the following information SBAR.

## 2021-01-21 NOTE — PROGRESS NOTES
Patient contacted regarding recent discharge and COVID-19 risk. Discussed COVID-19 related testing which was not done at this time. Test results were not done. Patient informed of results, if available? yes    Outreach made within 2 business days of discharge: Yes    Care Transition Nurse/ Ambulatory Care Manager/ LPN Care Coordinator contacted the patient by telephone to perform post discharge assessment. Verified name and  with patient as identifiers. Patient has following risk factors of: asthma. CTN/ACM/LPN reviewed discharge instructions, medical action plan and red flags related to discharge diagnosis. Reviewed and educated them on any new and changed medications related to discharge diagnosis. Advised obtaining a 90-day supply of all daily and as-needed medications. No new medications- reports he has stopped Lexapro. Advance Care Planning:   Does patient have an Advance Directive: currently not on file; education provided  No changes to his medical agent-currently lists his mother Ashwini Miller. Education provided regarding infection prevention, and signs and symptoms of COVID-19 and when to seek medical attention with patient who verbalized understanding. Discussed exposure protocols and quarantine from [address] you at higher risk for severe illness  and given an opportunity for questions and concerns. The patient agrees to contact the COVID-19 hotline [phone] or PCP office for questions related to their healthcare. CTN/ACM/LPN provided contact information for future reference. Patient has contacted PCP for follow up and is waiting for follow up from PCP office. [facility] contact information provided to patient. From CDC: Are you at higher risk for severe illness?  Wash your hands often.  Avoid close contact (6 feet, which is about two arm lengths) with people who are sick.    Put distance between yourself and other people if COVID-19 is spreading in your community.  Clean and disinfect frequently touched surfaces.  Avoid all cruise travel and non-essential air travel.  Call your healthcare professional if you have concerns about COVID-19 and your underlying condition or if you are sick. For more information on steps you can take to protect yourself, see CDC's How to Protect Yourself      Patient/family/caregiver given information for GetWell Loop and agrees to enroll yes  Patient's preferred e-mail:  n/a  Patient's preferred phone number: [phone]    Based on Loop alert triggers, patient will be contacted by nurse care manager for worsening symptoms. Pt will be further monitored by COVID Loop Team, pending account activation by patient.  based on severity of symptoms and risk factors.  DMB

## 2021-01-21 NOTE — ED NOTES
The patient was discharged home by Dr. Brianna Mcfadden and Beryl Swanson rn in stable condition. The patient is alert and oriented, is in no respiratory distress and has vital signs within normal limits . The patient's diagnosis, condition and treatment were explained to patient. The patient expressed understanding. No prescriptions given to pt. No work/school note given to pt. A discharge plan has been developed. A  was not involved in the process. Aftercare instructions were given to the patient. Pt's saline lock removed without complications. Pt will transport self home.

## 2021-01-21 NOTE — ED NOTES
7:11 PM  Change of shift. Care of patient taken over from Dr. Bradley Gerber; H&P reviewed, bedside handoff complete. Awaiting UA, KATIE evaluation. 9:02 PM  Discussed with BSMART, Duane. Patient does not meet criteria for admission. He discussed patient with Psychiatry NP, Cyndy Pimentel who advised patient could stop his Lexapro now and follow-up with his physician to switch to another medication. Faxed additional resources to be provided to patient.

## 2021-01-22 ENCOUNTER — OFFICE VISIT (OUTPATIENT)
Dept: INTERNAL MEDICINE CLINIC | Age: 26
End: 2021-01-22
Payer: MEDICAID

## 2021-01-22 VITALS
BODY MASS INDEX: 23.87 KG/M2 | HEIGHT: 74 IN | DIASTOLIC BLOOD PRESSURE: 83 MMHG | WEIGHT: 186 LBS | HEART RATE: 76 BPM | SYSTOLIC BLOOD PRESSURE: 130 MMHG | RESPIRATION RATE: 18 BRPM | OXYGEN SATURATION: 97 % | TEMPERATURE: 97.8 F

## 2021-01-22 DIAGNOSIS — F41.9 ANXIETY: ICD-10-CM

## 2021-01-22 DIAGNOSIS — F33.2 SEVERE EPISODE OF RECURRENT MAJOR DEPRESSIVE DISORDER, WITHOUT PSYCHOTIC FEATURES (HCC): Primary | ICD-10-CM

## 2021-01-22 PROCEDURE — 99214 OFFICE O/P EST MOD 30 MIN: CPT | Performed by: INTERNAL MEDICINE

## 2021-01-22 RX ORDER — BUPROPION HYDROCHLORIDE 150 MG/1
150 TABLET ORAL
Qty: 30 TAB | Refills: 1 | Status: SHIPPED | OUTPATIENT
Start: 2021-01-22 | End: 2021-02-05

## 2021-01-22 NOTE — PROGRESS NOTES
Assessment and Plan   Diagnoses and all orders for this visit:    1. Severe episode of recurrent major depressive disorder, without psychotic features ([facility] [address]  -     buPROPion XL (WELLBUTRIN XL) 150 mg tablet; Take 1 Tab by mouth every morning. Indications: anxiety and depression  2. Anxiety  From previous visit:  \"Reports severe depression symptoms associated with anxiety. Has also noted increased paranoia about \"literally everything. \"  Has thoughts about people talking about him behind his back or people thinking he is a suspicious person. Reports he had his friends have a \"bad history\" with law enforcement and is suspicious of undercover . Reports that his \"thoughts are illogical because there is no concrete evidence. \"  Reports having a negative view of the world. Denies any visual hallucinations. Reports a history of auditory hallucinations but thinks these may be related to drug use in the past which includes cocaine, marijuana, LSD, mushrooms. He also previously drank 1215 drinks a week of alcohol. He has not had any alcohol or recreational drugs since October 2019. Reports that his depressive symptoms have gotten worse after stopping substance use. Denies any episodes of [place] but reports his mom says he is \"up-and-down\" a lot. \"    Was started on Lexapro at this visit. However, 2 days ago, he woke up with severe anxiety and fatigue and weakness. He went to the emergency room. Blood work at that time was negative. He stopped taking Lexapro after this visit. Symptoms have somewhat improved although \"I still feel good. \"  Denies any fever, chills, cough, shortness of breath, palpitations, abdominal pain, nausea, vomiting, diarrhea, constipation. Denies any new stressors. Sleeps about 10-12 hours at night and feels well rested. Felt worse on Lexapro. Reports that he was previously on Wellbutrin and tolerated that medication well. He is interested in retrying this.   Denies a history of seizures or eating disorders. We will start Wellbutrin 150 mg daily. Given information for online counseling. He is supposed to call [facility] again to see if they have any openings for appointments. Benefits, risks, possible drug interactions, and side effects of all new medications were reviewed with the patient. Pt verbalized understanding. Return to clinic: 2 weeks for medication follow-up    An electronic signature was used to authenticate this note. Delisa Mccray MD  [facility]  1/22/2021    Future Appointments   Date Time Provider Abad Wallacei   9/0/8987  4:48 AM Karen Smalls MD [facility]        Subjective   Chief Complaint   Anxiety fatigue    Javy Lawrence is a 22 y.o. male           Objective   Vitals:       Visit Vitals  /83   Pulse 76   Temp 97.8 °F (36.6 °C)   Resp 18   Ht 6' 2\" (1.88 m)   Wt 186 lb (84.4 kg)   SpO2 97%   BMI 23.88 kg/m²        Physical Exam  Constitutional:       Appearance: Normal appearance. He is not ill-appearing. Cardiovascular:      Rate and Rhythm: Normal rate and regular rhythm. Heart sounds: No murmur. No friction rub. No gallop. Pulmonary:      Effort: No respiratory distress. Breath sounds: Normal breath sounds. No wheezing, rhonchi or rales. Neurological:      Mental Status: He is alert. Psychiatric:         Thought Content: Thought content normal.         Judgment: Judgment normal.      Comments: Flat affect          Current Outpatient Medications   Medication Sig    buPROPion XL (WELLBUTRIN XL) 150 mg tablet Take 1 Tab by mouth every morning. Indications: anxiety and depression    albuterol (PROVENTIL HFA, VENTOLIN HFA, PROAIR HFA) 90 mcg/actuation inhaler Take  by inhalation. No current facility-administered medications for this visit.

## 2021-02-03 ENCOUNTER — HOSPITAL ENCOUNTER (EMERGENCY)
Age: 26
Discharge: PSYCHIATRIC OR SUBSTANCE USE FACILITY | End: 2021-02-04
Attending: EMERGENCY MEDICINE
Payer: MEDICAID

## 2021-02-03 DIAGNOSIS — R45.851 SUICIDAL IDEATION: Primary | ICD-10-CM

## 2021-02-03 LAB
ALBUMIN SERPL-MCNC: 4.4 G/DL (ref 3.5–5)
ALBUMIN/GLOB SERPL: 1.4 {RATIO} (ref 1.1–2.2)
ALP SERPL-CCNC: 69 U/L (ref 45–117)
ALT SERPL-CCNC: 29 U/L (ref 12–78)
AMPHET UR QL SCN: NEGATIVE
ANION GAP SERPL CALC-SCNC: 6 MMOL/L (ref 5–15)
APPEARANCE UR: CLEAR
AST SERPL-CCNC: 19 U/L (ref 15–37)
BACTERIA URNS QL MICRO: NEGATIVE /HPF
BARBITURATES UR QL SCN: NEGATIVE
BASOPHILS # BLD: 0 K/UL (ref 0–0.1)
BASOPHILS NFR BLD: 1 % (ref 0–1)
BENZODIAZ UR QL: NEGATIVE
BILIRUB SERPL-MCNC: 0.6 MG/DL (ref 0.2–1)
BILIRUB UR QL: NEGATIVE
BUN SERPL-MCNC: 16 MG/DL (ref 6–20)
BUN/CREAT SERPL: 14 (ref 12–20)
CALCIUM SERPL-MCNC: 8.8 MG/DL (ref 8.5–10.1)
CANNABINOIDS UR QL SCN: NEGATIVE
CHLORIDE SERPL-SCNC: 103 MMOL/L (ref 97–108)
CO2 SERPL-SCNC: 30 MMOL/L (ref 21–32)
COCAINE UR QL SCN: NEGATIVE
COLOR UR: NORMAL
COVID-19 RAPID TEST, COVR: NOT DETECTED
CREAT SERPL-MCNC: 1.16 MG/DL (ref 0.7–1.3)
DIFFERENTIAL METHOD BLD: NORMAL
DRUG SCRN COMMENT,DRGCM: NORMAL
EOSINOPHIL # BLD: 0.3 K/UL (ref 0–0.4)
EOSINOPHIL NFR BLD: 5 % (ref 0–7)
EPITH CASTS URNS QL MICRO: NORMAL /LPF
ERYTHROCYTE [DISTWIDTH] IN BLOOD BY AUTOMATED COUNT: 11.7 % (ref 11.5–14.5)
ETHANOL SERPL-MCNC: <10 MG/DL
GLOBULIN SER CALC-MCNC: 3.1 G/DL (ref 2–4)
GLUCOSE SERPL-MCNC: 95 MG/DL (ref 65–100)
GLUCOSE UR STRIP.AUTO-MCNC: NEGATIVE MG/DL
HCT VFR BLD AUTO: 46.6 % (ref 36.6–50.3)
HGB BLD-MCNC: 16 G/DL (ref 12.1–17)
HGB UR QL STRIP: NEGATIVE
IMM GRANULOCYTES # BLD AUTO: 0 K/UL (ref 0–0.04)
IMM GRANULOCYTES NFR BLD AUTO: 0 % (ref 0–0.5)
KETONES UR QL STRIP.AUTO: NEGATIVE MG/DL
LEUKOCYTE ESTERASE UR QL STRIP.AUTO: NEGATIVE
LYMPHOCYTES # BLD: 2.5 K/UL (ref 0.8–3.5)
LYMPHOCYTES NFR BLD: 48 % (ref 12–49)
MCH RBC QN AUTO: 30.1 PG (ref 26–34)
MCHC RBC AUTO-ENTMCNC: 34.3 G/DL (ref 30–36.5)
MCV RBC AUTO: 87.8 FL (ref 80–99)
METHADONE UR QL: NEGATIVE
MONOCYTES # BLD: 0.4 K/UL (ref 0–1)
MONOCYTES NFR BLD: 9 % (ref 5–13)
NEUTS SEG # BLD: 1.9 K/UL (ref 1.8–8)
NEUTS SEG NFR BLD: 37 % (ref 32–75)
NITRITE UR QL STRIP.AUTO: NEGATIVE
NRBC # BLD: 0 K/UL (ref 0–0.01)
NRBC BLD-RTO: 0 PER 100 WBC
OPIATES UR QL: NEGATIVE
PCP UR QL: NEGATIVE
PH UR STRIP: 6 [PH] (ref 5–8)
PLATELET # BLD AUTO: 189 K/UL (ref 150–400)
PMV BLD AUTO: 10.7 FL (ref 8.9–12.9)
POTASSIUM SERPL-SCNC: 4.2 MMOL/L (ref 3.5–5.1)
PROT SERPL-MCNC: 7.5 G/DL (ref 6.4–8.2)
PROT UR STRIP-MCNC: NEGATIVE MG/DL
RBC # BLD AUTO: 5.31 M/UL (ref 4.1–5.7)
RBC #/AREA URNS HPF: NORMAL /HPF (ref 0–5)
SARS-COV-2, COV2: NORMAL
SODIUM SERPL-SCNC: 139 MMOL/L (ref 136–145)
SOURCE, COVRS: NORMAL
SP GR UR REFRACTOMETRY: 1.02 (ref 1–1.03)
UROBILINOGEN UR QL STRIP.AUTO: 0.2 EU/DL (ref 0.2–1)
WBC # BLD AUTO: 5.1 K/UL (ref 4.1–11.1)
WBC URNS QL MICRO: NORMAL /HPF (ref 0–4)

## 2021-02-03 PROCEDURE — 85025 COMPLETE CBC W/AUTO DIFF WBC: CPT

## 2021-02-03 PROCEDURE — 80053 COMPREHEN METABOLIC PANEL: CPT

## 2021-02-03 PROCEDURE — 36415 COLL VENOUS BLD VENIPUNCTURE: CPT

## 2021-02-03 PROCEDURE — 87635 SARS-COV-2 COVID-19 AMP PRB: CPT

## 2021-02-03 PROCEDURE — 82077 ASSAY SPEC XCP UR&BREATH IA: CPT

## 2021-02-03 PROCEDURE — 90791 PSYCH DIAGNOSTIC EVALUATION: CPT

## 2021-02-03 PROCEDURE — 81001 URINALYSIS AUTO W/SCOPE: CPT

## 2021-02-03 PROCEDURE — 99285 EMERGENCY DEPT VISIT HI MDM: CPT

## 2021-02-03 PROCEDURE — 80307 DRUG TEST PRSMV CHEM ANLYZR: CPT

## 2021-02-04 ENCOUNTER — HOSPITAL ENCOUNTER (INPATIENT)
Age: 26
LOS: 1 days | Discharge: HOME | DRG: 751 | End: 2021-02-05
Attending: PSYCHIATRY & NEUROLOGY | Admitting: PSYCHIATRY & NEUROLOGY
Payer: MEDICAID

## 2021-02-04 VITALS
BODY MASS INDEX: 23.68 KG/M2 | WEIGHT: 184.53 LBS | DIASTOLIC BLOOD PRESSURE: 64 MMHG | TEMPERATURE: 98 F | SYSTOLIC BLOOD PRESSURE: 110 MMHG | HEART RATE: 61 BPM | HEIGHT: 74 IN | RESPIRATION RATE: 20 BRPM | OXYGEN SATURATION: 96 %

## 2021-02-04 DIAGNOSIS — F33.2 SEVERE EPISODE OF RECURRENT MAJOR DEPRESSIVE DISORDER, WITHOUT PSYCHOTIC FEATURES (HCC): ICD-10-CM

## 2021-02-04 DIAGNOSIS — J45.990 EXERCISE-INDUCED ASTHMA: ICD-10-CM

## 2021-02-04 DIAGNOSIS — F34.1 DYSTHYMIA: ICD-10-CM

## 2021-02-04 PROBLEM — F41.9 ANXIETY: Status: RESOLVED | Noted: 2021-01-04 | Resolved: 2021-02-04

## 2021-02-04 LAB — SARS-COV-2, COV2: NORMAL

## 2021-02-04 PROCEDURE — 65220000003 HC RM SEMIPRIVATE PSYCH

## 2021-02-04 PROCEDURE — U0005 INFEC AGEN DETEC AMPLI PROBE: HCPCS

## 2021-02-04 PROCEDURE — 99223 1ST HOSP IP/OBS HIGH 75: CPT | Performed by: PSYCHIATRY & NEUROLOGY

## 2021-02-04 PROCEDURE — 74011250637 HC RX REV CODE- 250/637: Performed by: PSYCHIATRY & NEUROLOGY

## 2021-02-04 RX ORDER — CHOLECALCIFEROL (VITAMIN D3) 125 MCG
5 CAPSULE ORAL
COMMUNITY
End: 2022-10-10 | Stop reason: ALTCHOICE

## 2021-02-04 RX ORDER — DIPHENHYDRAMINE HYDROCHLORIDE 50 MG/ML
50 INJECTION, SOLUTION INTRAMUSCULAR; INTRAVENOUS
Status: DISCONTINUED | OUTPATIENT
Start: 2021-02-04 | End: 2021-02-05 | Stop reason: HOSPADM

## 2021-02-04 RX ORDER — ARIPIPRAZOLE 5 MG/1
2.5 TABLET ORAL DAILY
Status: DISCONTINUED | OUTPATIENT
Start: 2021-02-04 | End: 2021-02-05

## 2021-02-04 RX ORDER — OLANZAPINE 5 MG/1
5 TABLET ORAL
Status: DISCONTINUED | OUTPATIENT
Start: 2021-02-04 | End: 2021-02-05 | Stop reason: HOSPADM

## 2021-02-04 RX ORDER — TRAZODONE HYDROCHLORIDE 50 MG/1
50 TABLET ORAL
Status: DISCONTINUED | OUTPATIENT
Start: 2021-02-04 | End: 2021-02-05 | Stop reason: HOSPADM

## 2021-02-04 RX ORDER — HALOPERIDOL 5 MG/ML
5 INJECTION INTRAMUSCULAR
Status: DISCONTINUED | OUTPATIENT
Start: 2021-02-04 | End: 2021-02-05 | Stop reason: HOSPADM

## 2021-02-04 RX ORDER — LORAZEPAM 2 MG/ML
1 INJECTION INTRAMUSCULAR
Status: DISCONTINUED | OUTPATIENT
Start: 2021-02-04 | End: 2021-02-05 | Stop reason: HOSPADM

## 2021-02-04 RX ORDER — ACETAMINOPHEN 325 MG/1
650 TABLET ORAL
Status: DISCONTINUED | OUTPATIENT
Start: 2021-02-04 | End: 2021-02-05 | Stop reason: HOSPADM

## 2021-02-04 RX ORDER — HYDROXYZINE 25 MG/1
50 TABLET, FILM COATED ORAL
Status: DISCONTINUED | OUTPATIENT
Start: 2021-02-04 | End: 2021-02-05 | Stop reason: HOSPADM

## 2021-02-04 RX ORDER — ADHESIVE BANDAGE
30 BANDAGE TOPICAL DAILY PRN
Status: DISCONTINUED | OUTPATIENT
Start: 2021-02-04 | End: 2021-02-05 | Stop reason: HOSPADM

## 2021-02-04 RX ORDER — IBUPROFEN 200 MG
400 TABLET ORAL
COMMUNITY
End: 2022-10-10 | Stop reason: ALTCHOICE

## 2021-02-04 RX ORDER — BENZTROPINE MESYLATE 1 MG/1
1 TABLET ORAL
Status: DISCONTINUED | OUTPATIENT
Start: 2021-02-04 | End: 2021-02-05 | Stop reason: HOSPADM

## 2021-02-04 RX ORDER — BUPROPION HYDROCHLORIDE 150 MG/1
300 TABLET ORAL
Status: DISCONTINUED | OUTPATIENT
Start: 2021-02-04 | End: 2021-02-05 | Stop reason: HOSPADM

## 2021-02-04 RX ADMIN — TRAZODONE HYDROCHLORIDE 50 MG: 50 TABLET ORAL at 22:27

## 2021-02-04 RX ADMIN — ARIPIPRAZOLE 2.5 MG: 5 TABLET ORAL at 11:53

## 2021-02-04 RX ADMIN — BUPROPION HYDROCHLORIDE 300 MG: 150 TABLET, EXTENDED RELEASE ORAL at 11:53

## 2021-02-04 RX ADMIN — HYDROXYZINE HYDROCHLORIDE 50 MG: 25 TABLET, FILM COATED ORAL at 22:27

## 2021-02-04 NOTE — H&P
INITIAL PSYCHIATRIC EVALUATION            IDENTIFICATION:    Patient Name  Jordyn Rodriguez   Date of Birth 1995   [facility] [id number]   Medical Record Number  [id number]      Age  22 y.o. PCP Ozzie Sorto MD   Admit date:  2/4/2021    Room Number  320/02  @ [facility]   Date of Service  2/4/2021            HISTORY         REASON FOR HOSPITALIZATION:  CC: \"suicidal ideation\". Pt admitted under a voluntary basis for suicidal ideations proving to be an imminent danger to self and an inability to care for self. HISTORY OF PRESENT ILLNESS:    The patient, Jordyn Rodriguez, is a 22 y.o. WHITE OR  male with a past psychiatric history significant for MDD, who presents at this time with complaints of (and/or evidence of) the following emotional symptoms: depression and suicidal thoughts/threats. Additional symptomatology include isolation, insomnia, and poor appetite. The above symptoms have been present for 2+ weeks. These symptoms are of moderate to high severity. These symptoms are constant in nature. The patient's condition has been precipitated by psychosocial stressors. UDS: negative; BAL=0. Per nursing report, patint admitted due to ongoing SI without plan but with worsening fixation on ways to end his life. He states that he is having vivid images in his head linked to intrusive thoughts of how he could go about harming himself. He reports a great support system at home. Patient state he started Lexapro recently, and was switched to Wellbutrin due to side effects. He reports poor sleep, anhedonia, and ongoing difficulty with ADLs. The patient is a good historian. The patient corroborates the above narrative. The patient contracts for safety on the unit and gives consent for the team to contact collateral. The patient is amenable to initiating treatment while on the unit.  After a discussion of risk and benefit, the patient agrees to increase his antidepressant and start an atypical antipsychotic for augmentation. ALLERGIES:   Allergies   Allergen Reactions    Oxycodone Nausea and Vomiting      MEDICATIONS PRIOR TO ADMISSION:   Medications Prior to Admission   Medication Sig    melatonin 5 mg tablet Take 5 mg by mouth nightly as needed (insomnia).  ibuprofen (MOTRIN) 200 mg tablet Take 400 mg by mouth every six (6) hours as needed for Pain.  buPROPion XL (WELLBUTRIN XL) 150 mg tablet Take 1 Tab by mouth every morning. Indications: anxiety and depression    albuterol (PROVENTIL HFA, VENTOLIN HFA, PROAIR HFA) 90 mcg/actuation inhaler Take 2 Puffs by inhalation every six (6) hours as needed for Wheezing or Shortness of Breath. PAST MEDICAL HISTORY:   Past Medical History:   Diagnosis Date    ADHD     Depression     Exercise-induced asthma     Psychiatric disorder     , depression anxiety     Past Surgical History:   Procedure Laterality Date    HX ORTHOPAEDIC      right wrist repair (tendon or ligament?)      SOCIAL HISTORY:  The patient is currently a full time student; the patient is not a smoker; the patient's marital status is single; the patient does not have children; the patient reports the highest level of education achieved is high school, he is currently enrolled in college. The patient lives with his mother and boyfriend. FAMILY HISTORY: History reviewed, pertinent family history as below: depression in mother's side of family, multiple suicide attempts, two completed suicides in extended family. Sister with depression. REVIEW OF SYSTEMS:   Pertinent items are noted in the History of Present Illness. All other Systems reviewed and are considered negative.            MENTAL STATUS EXAM & VITALS     MENTAL STATUS EXAM (MSE):    MSE FINDINGS ARE WITHIN NORMAL LIMITS (WNL) UNLESS OTHERWISE STATED BELOW. ( ALL OF THE BELOW CATEGORIES OF THE MSE HAVE BEEN REVIEWED (reviewed 2/4/2021) AND UPDATED AS DEEMED APPROPRIATE )  General Presentation age appropriate and disheveled, cooperative and guarded   Orientation oriented to time, place and person   Vital Signs  See below (reviewed 2/4/2021); Vital Signs (BP, Pulse, & Temp) are within normal limits if not listed below.    Gait and Station Stable/steady, no ataxia   Musculoskeletal System No extrapyramidal symptoms (EPS); no abnormal muscular movements or Tardive Dyskinesia (TD); muscle strength and tone are within normal limits   Language No aphasia or dysarthria   Speech:  non-pressured and soft   Thought Processes logical; slow rate of thoughts; good abstract reasoning/computation   Thought Associations goal directed   Thought Content preoccupations   Suicidal Ideations contracts for safety   Homicidal Ideations none   Mood:  depressed and anhedonia   Affect:  blunted and mood-congruent   Memory recent  intact   Memory remote:  intact   Concentration/Attention:  intact   Fund of Knowledge average   Insight:  fair   Reliability fair   Judgment:  limited          VITALS:     Patient Vitals for the past 24 hrs:   Temp Pulse Resp BP SpO2   02/04/21 0827 98 °F (36.7 °C) 85 16 132/87 98 %     Wt Readings from Last 3 Encounters:   02/04/21 81.6 kg (180 lb)   02/03/21 83.7 kg (184 lb 8.4 oz)   01/22/21 84.4 kg (186 lb)     Temp Readings from Last 3 Encounters:   02/04/21 98 °F (36.7 °C)   02/04/21 98 °F (36.7 °C)   01/22/21 97.8 °F (36.6 °C)     BP Readings from Last 3 Encounters:   02/04/21 132/87   02/04/21 110/64   01/22/21 130/83     Pulse Readings from Last 3 Encounters:   02/04/21 85   02/04/21 61   01/22/21 76            DATA     LABORATORY DATA:  Labs Reviewed   SARS-COV-2     Admission on 02/03/2021, Discharged on 02/04/2021   Component Date Value Ref Range Status    WBC 02/03/2021 5.1  4.1 - 11.1 K/uL Final    RBC 02/03/2021 5.31  4.10 - 5.70 M/uL Final    HGB 02/03/2021 16.0  12.1 - 17.0 g/dL Final    HCT 02/03/2021 46.6  36.6 - 50.3 % Final    MCV 02/03/2021 87.8  80.0 - 99.0 FL Final    MCH 02/03/2021 30.1  26.0 - 34.0 PG Final    MCHC 02/03/2021 34.3  30.0 - 36.5 g/dL Final    RDW 02/03/2021 11.7  11.5 - 14.5 % Final    PLATELET 84/18/6513 624  150 - 400 K/uL Final    MPV 02/03/2021 10.7  8.9 - 12.9 FL Final    NRBC 02/03/2021 0.0  0.0  WBC Final    ABSOLUTE NRBC 02/03/2021 0.00  0.00 - 0.01 K/uL Final    NEUTROPHILS 02/03/2021 37  32 - 75 % Final    LYMPHOCYTES 02/03/2021 48  12 - 49 % Final    MONOCYTES 02/03/2021 9  5 - 13 % Final    EOSINOPHILS 02/03/2021 5  0 - 7 % Final    BASOPHILS 02/03/2021 1  0 - 1 % Final    IMMATURE GRANULOCYTES 02/03/2021 0  0 - 0.5 % Final    ABS. NEUTROPHILS 02/03/2021 1.9  1.8 - 8.0 K/UL Final    ABS. LYMPHOCYTES 02/03/2021 2.5  0.8 - 3.5 K/UL Final    ABS. MONOCYTES 02/03/2021 0.4  0.0 - 1.0 K/UL Final    ABS. EOSINOPHILS 02/03/2021 0.3  0.0 - 0.4 K/UL Final    ABS. BASOPHILS 02/03/2021 0.0  0.0 - 0.1 K/UL Final    ABS. IMM. GRANS. 02/03/2021 0.0  0.00 - 0.04 K/UL Final    DF 02/03/2021 AUTOMATED    Final    Sodium 02/03/2021 139  136 - 145 mmol/L Final    Potassium 02/03/2021 4.2  3.5 - 5.1 mmol/L Final    Chloride 02/03/2021 103  97 - 108 mmol/L Final    CO2 02/03/2021 30  21 - 32 mmol/L Final    Anion gap 02/03/2021 6  5 - 15 mmol/L Final    Glucose 02/03/2021 95  65 - 100 mg/dL Final    BUN 02/03/2021 16  6 - 20 MG/DL Final    Creatinine 02/03/2021 1.16  0.70 - 1.30 MG/DL Final    BUN/Creatinine ratio 02/03/2021 14  12 - 20   Final    GFR est AA 02/03/2021 >60  >60 ml/min/1.73m2 Final    GFR est non-AA 02/03/2021 >60  >60 ml/min/1.73m2 Final    Calcium 02/03/2021 8.8  8.5 - 10.1 MG/DL Final    Bilirubin, total 02/03/2021 0.6  0.2 - 1.0 MG/DL Final    ALT (SGPT) 02/03/2021 29  12 - 78 U/L Final    AST (SGOT) 02/03/2021 19  15 - 37 U/L Final    Alk.  phosphatase 02/03/2021 69  45 - 117 U/L Final    Protein, total 02/03/2021 7.5  6.4 - 8.2 g/dL Final    Albumin 02/03/2021 4.4  3.5 - 5.0 g/dL Final    Globulin 02/03/2021 3.1 2.0 - 4.0 g/dL Final    A-G Ratio 02/03/2021 1.4  1.1 - 2.2   Final    AMPHETAMINES 02/03/2021 Negative  NEG   Final    BARBITURATES 02/03/2021 Negative  NEG   Final    BENZODIAZEPINES 02/03/2021 Negative  NEG   Final    COCAINE 02/03/2021 Negative  NEG   Final    METHADONE 02/03/2021 Negative  NEG   Final    OPIATES 02/03/2021 Negative  NEG   Final    PCP(PHENCYCLIDINE) 02/03/2021 Negative  NEG   Final    THC (TH-CANNABINOL) 02/03/2021 Negative  NEG   Final    Drug screen comment 02/03/2021 (NOTE)   Final    ALCOHOL(ETHYL),SERUM 02/03/2021 <10  <10 MG/DL Final    SARS-CoV-2 02/03/2021 Please find results under separate order    Final    Specimen source 02/03/2021 Nasopharyngeal    Final    COVID-19 rapid test 02/03/2021 Not detected  NOTD   Final    Color 02/03/2021 YELLOW/STRAW    Final    Appearance 02/03/2021 CLEAR  CLEAR   Final    Specific gravity 02/03/2021 1.025  1.003 - 1.030   Final    pH (UA) 02/03/2021 6.0  5.0 - 8.0   Final    Protein 02/03/2021 Negative  NEG mg/dL Final    Glucose 02/03/2021 Negative  NEG mg/dL Final    Ketone 02/03/2021 Negative  NEG mg/dL Final    Bilirubin 02/03/2021 Negative  NEG   Final    Blood 02/03/2021 Negative  NEG   Final    Urobilinogen 02/03/2021 0.2  0.2 - 1.0 EU/dL Final    Nitrites 02/03/2021 Negative  NEG   Final    Leukocyte Esterase 02/03/2021 Negative  NEG   Final    WBC 02/03/2021 0-4  0 - 4 /hpf Final    RBC 02/03/2021 0-5  0 - 5 /hpf Final    Epithelial cells 02/03/2021 FEW  FEW /lpf Final    Bacteria 02/03/2021 Negative  NEG /hpf Final        RADIOLOGY REPORTS:  Results from Hospital Encounter encounter on 02/25/18   XR CHEST PA LAT    Narrative Chest PA and lateral    History: Acute pleuritic right-sided chest pain    Comparison: None    Findings: The lungs are well expanded. No focal consolidation, pleural  effusion, or pneumothorax. The cardiomediastinal silhouette is unremarkable.    The visualized osseous structures are unremarkable. Impression Impression:  No acute cardiopulmonary process. No results found. MEDICATIONS       ALL MEDICATIONS  Current Facility-Administered Medications   Medication Dose Route Frequency    OLANZapine (ZyPREXA) tablet 5 mg  5 mg Oral Q6H PRN    haloperidol lactate (HALDOL) injection 5 mg  5 mg IntraMUSCular Q6H PRN    benztropine (COGENTIN) tablet 1 mg  1 mg Oral BID PRN    diphenhydrAMINE (BENADRYL) injection 50 mg  50 mg IntraMUSCular BID PRN    hydrOXYzine HCL (ATARAX) tablet 50 mg  50 mg Oral TID PRN    LORazepam (ATIVAN) injection 1 mg  1 mg IntraMUSCular Q4H PRN    traZODone (DESYREL) tablet 50 mg  50 mg Oral QHS PRN    acetaminophen (TYLENOL) tablet 650 mg  650 mg Oral Q4H PRN    magnesium hydroxide (MILK OF MAGNESIA) 400 mg/5 mL oral suspension 30 mL  30 mL Oral DAILY PRN    buPROPion XL (WELLBUTRIN XL) tablet 300 mg  300 mg Oral 7am    ARIPiprazole (ABILIFY) tablet 2.5 mg  2.5 mg Oral DAILY      SCHEDULED MEDICATIONS  Current Facility-Administered Medications   Medication Dose Route Frequency    buPROPion XL (WELLBUTRIN XL) tablet 300 mg  300 mg Oral 7am    ARIPiprazole (ABILIFY) tablet 2.5 mg  2.5 mg Oral DAILY                ASSESSMENT & PLAN        The patient, Cassandra Morrow, is a 22 y.o.  male who presents at this time for treatment of the following diagnoses:  Patient Active Hospital Problem List:   Severe episode of recurrent major depressive disorder, without psychotic features ([facility] [address] [facility] (1/4/2021)    Assessment: patient presenting with worsening suicidal ideation, he has been tried on SSRI in the past and recently switched to atypical agent. High family history of suicide. Will start low dose antipsychotic for augmentation, increase antidepressant, observe.     Plan:   - RESTART and INCREASE Wellbutrin to  mg QDAY for MDD  - START Abilify 2.5 mg QDAY for MDD adjunct  - Consider ECT   - IGM therapy as tolerated  - Expand database / obtain collateral  - Dispo planning           A coordinated, multidisplinary treatment team (includes the nurse, unit pharmacist,  and writer) round was conducted for this initial evaluation with the patient present. The following regarding medications was addressed during rounds with patient: the risks and benefits of the proposed medication. The patient was given the opportunity to ask questions. Informed consent given to the use of the above medications. I will continue to adjust psychiatric and non-psychiatric medications (see above \"medication\" section and orders section for details) as deemed appropriate & based upon diagnoses and response to treatment. I have reviewed admission (and previous/old) labs and medical tests in the EHR and or transferring hospital documents. I will continue to order blood tests/labs and diagnostic tests as deemed appropriate and review results as they become available (see orders for details). I have reviewed old psychiatric and medical records available in the EHR. I Will order additional psychiatric records from other institutions to further elucidate the nature of patient's psychopathology and review once available. I will gather additional collateral information from friends, family and o/p treatment team to further elucidate the nature of patient's psychopathology and baselline level of psychiatric functioning. I certify that this patient's inpatient psychiatric hospital services are required for treatment that could reasonably be expected to improve the patient's condition, or for diagnostic study, and that the patient continues to need, on a daily basis, active treatment furnished directly by or requiring the supervision of inpatient psychiatric facility personnel. In addition, the hospital records show that services furnished were intensive treatment services, admission or related services, or equivalent services.       ESTIMATED LENGTH OF STAY:  3-5 days STRENGTHS:  Exercising self-direction/Resourceful, Access to housing/residential stability and Interpersonal/supportive relationships (family, friends, peers)                                        SIGNED:    Autumn Burton MD  2/4/2021

## 2021-02-04 NOTE — GROUP NOTE
CANDIE  GROUP DOCUMENTATION INDIVIDUAL Group Therapy Note Date: 2/4/2021 Group Start Time: 0900 Group End Time: 1000 Group Topic: Topic Group [facility] 3 ACUTE BEHAV [facility] [place], [address] GROUP DOCUMENTATION GROUP Group Therapy Note Attendees: 6 Attendance: Did not attend Patient's Goal: Interventions/techniques: 
Michael Duval

## 2021-02-04 NOTE — PROGRESS NOTES
Spiritual Care Assessment/Progress Note  Josh Pratt      NAME: Gosia Moctezuma      MRN: [id number]  AGE: 22 y.o.  SEX: male  Druze Affiliation: No preference   Language: English     2/4/2021     Total Time (in minutes): 10     Spiritual Assessment begun in [place] [address] [address] through conversation with:         [x]Patient        [] Family    [] Friend(s)        Reason for Consult: Initial/Spiritual assessment, patient floor     Spiritual beliefs: (Please include comment if needed)     [] Identifies with a darren tradition:         [] Supported by a darren community:            [] Claims no spiritual orientation:           [] Seeking spiritual identity:                [] Adheres to an individual form of spirituality:           [x] Not able to assess:                           Identified resources for coping:      [] Prayer                               [] Music                  [] Guided Imagery     [x] Family/friends                 [] Pet visits     [] Devotional reading                         [] Unknown     [] Other:                                               Interventions offered during this visit: (See comments for more details)    Patient Interventions: Affirmation of emotions/emotional suffering, Coping skills reviewed/reinforced, Initial/Spiritual assessment, patient floor, Normalization of emotional/spiritual concerns, Prayer (assurance of)           Plan of Care:     [] Support spiritual and/or cultural needs    [] Support AMD and/or advance care planning process      [] Support grieving process   [] Coordinate Rites and/or Rituals    [] Coordination with community clergy   [] No spiritual needs identified at this time   [] Detailed Plan of Care below (See Comments)  [] Make referral to Music Therapy  [] Make referral to Pet Therapy     [] Make referral to Addiction services  [] Make referral to [facility]  [] Make referral to Spiritual Care Partner  [] No future visits requested [x] Follow up upon further referrals     Comments: Responded to page regarding spiritual care visit for pt [facility]. Introduced  services. Processed inability to sleep and desire for rest. Affirmed stress resulting from lack of rest. Assured of prayers and advised of services. Contact Spiritual Care for any further referrals.     Maria Dolores 1 CHRISTINE Jones 1 Provider   Paging Service [phone])

## 2021-02-04 NOTE — ED NOTES
MAGGY called to arrange transportation to [facility] for [employer]; eta [phone].

## 2021-02-04 NOTE — ED NOTES
Patient is resting quietly , no signs of distress. Remains cooperative.  Awaiting transport to [facility]

## 2021-02-04 NOTE — GROUP NOTE
CANDIE  GROUP DOCUMENTATION INDIVIDUAL Group Therapy Note Date: 2/4/2021 Group Start Time: 1100 Group End Time: 1200 Group Topic: Topic Group [facility] 3 ACUTE BEHAV [facility] [place], [address] [facility] GROUP DOCUMENTATION GROUP Group Therapy Note Attendees: 4 Attendance: Did not attend Patient's Goal: Interventions/techniques Ramirez Paiz
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
## 2021-02-04 NOTE — PROGRESS NOTES
Problem: Discharge Planning  Goal: *Discharge to safe environment  Outcome: Progressing Towards Goal  Note: Patient identifies home as a safe environment. Patient will return home upon discharge. Goal: *Knowledge of medication management  Outcome: Progressing Towards Goal  Note: Patient verbalizes understanding of medication regimen. Patient is taking medications as prescribed.    Goal: *Knowledge of discharge instructions  Outcome: Progressing Towards Goal  Note: Patient verbalizes understanding of goals for treatment and safe discharge

## 2021-02-04 NOTE — GROUP NOTE
CANDIE  GROUP DOCUMENTATION INDIVIDUAL Group Therapy Note Date: 2/4/2021 Group Start Time: 1500 Group End Time: 6476 Group Topic: Recreational/Music Therapy [facility] [place] [address] ACUTE BEHAV [facility] [place], [address] GROUP DOCUMENTATION GROUP Group Therapy Note Attendees: 7 Attendance: Did not attend Patient's Goal: Interventions/techniques:Christiana Castelan

## 2021-02-04 NOTE — PROGRESS NOTES
Problem: Depressed Mood (Adult/Pediatric)  Goal: *STG: Participates in treatment plan  Outcome: Progressing Towards Goal  Goal: *STG: Remains safe in hospital  Outcome: Progressing Towards Goal  Goal: *STG: Complies with medication therapy  Outcome: Progressing Towards Goal

## 2021-02-04 NOTE — PROGRESS NOTES
Problem: Depressed Mood (Adult/Pediatric)  Goal: *STG: Participates in treatment plan  2/4/2021 1122 by Ludmila Tamayo RN  Outcome: Progressing Towards Goal  2/4/2021 0945 by Ludmila Tamayo RN  Outcome: Progressing Towards Goal  Goal: *STG: Remains safe in hospital  2/4/2021 1122 by Ludmila Tamayo RN  Outcome: Progressing Towards Goal  2/4/2021 0945 by Ludmila Tamayo RN  Outcome: Progressing Towards Goal  Goal: *STG: Complies with medication therapy  2/4/2021 1122 by Ludmila Tamayo RN  Outcome: Progressing Towards Goal  2/4/2021 0945 by Ludmila Tamayo RN  Outcome: Progressing Towards Goal     [id number] Patient finished admission process. Given tour around the unit. Patient denies SI/HI/AVH at this time. Patient resting in room. Tolerated breakfast without issue. [id number] Patient received first dose teaching of abilify 2.5mg and wellbutrin 300 mg. Patient tolerated medications without issue. Patient then ambulated back his room to rest. No further issues noted at this time. [id number] Patient resting in room at this time. [id number] Patient resting in room at this time. COVID-19 PCR drawn at 1645. Patient stated, \"I think I am done sleeping but I'm not sure what I want to do. \" Davey Ballard did teaching with patient related to being patient with self. Patient was receptive of teaching. No further issues noted at this time.

## 2021-02-04 NOTE — ED TRIAGE NOTES
Patient c/o suicidal ideation  Constantly , has no common plan or scheme. Reluctant to talk about situation.

## 2021-02-04 NOTE — ED PROVIDER NOTES
HPI   22-year-old male presents with suicidal ideation. Patient states he has felt this way for a while and has always denied when asked. He has numerous plans that run through his head several times a day. He has thoughts about getting on an [place] train. He recently moved here from [facility] and is living with his mother. He is not currently working. He states he has not used alcohol or drugs in the past year and a half. He is on Wellbutrin which she just started recently. He has no significant past medical history and is not on any other medications. He denies any acts to harm himself recently. No history of psychiatric admission.   Past Medical History:   Diagnosis Date    ADHD     Depression     Exercise-induced asthma     Psychiatric disorder     , depression anxiety       Past Surgical History:   Procedure Laterality Date    HX ORTHOPAEDIC      right wrist repair (tendon or ligament?)         Family History:   Problem Relation Age of Onset    No Known Problems Mother     No Known Problems Father        Social History     Socioeconomic History    Marital status: SINGLE     Spouse name: Not on file    Number of children: Not on file    Years of education: Not on file    Highest education level: Not on file   Occupational History    Not on file   Social Needs    Financial resource strain: Not on file    Food insecurity     Worry: Not on file     Inability: Not on file    Transportation needs     Medical: Not on file     Non-medical: Not on file   Tobacco Use    Smoking status: Former Smoker    Smokeless tobacco: Never Used    Tobacco comment: 1ppd from age [age], quit in 2020   Substance and Sexual Activity    Alcohol use: Not Currently     Comment: none since 10/2019; 12-15 drinks/week previously    Drug use: Yes     Types: Marijuana, Cocaine, Other     Comment: LSD also, mushrooms, none since 10/2019    Sexual activity: Not Currently     Partners: Female   Lifestyle    Physical activity     Days per week: Not on file     Minutes per session: Not on file    Stress: Not on file   Relationships    Social connections     Talks on phone: Not on file     Gets together: Not on file     Attends Judaism service: Not on file     Active member of club or organization: Not on file     Attends meetings of clubs or organizations: Not on file     Relationship status: Not on file    Intimate partner violence     Fear of current or ex partner: Not on file     Emotionally abused: Not on file     Physically abused: Not on file     Forced sexual activity: Not on file   Other Topics Concern    Not on file   Social History Narrative    Not on file         ALLERGIES: Oxycodone    Review of Systems   Constitutional: Negative for chills and fever. HENT: Negative for congestion and rhinorrhea. Respiratory: Negative for cough and shortness of breath. Gastrointestinal: Negative for abdominal pain, diarrhea, nausea and vomiting. Skin: Negative for rash and wound. Neurological: Negative for weakness, numbness and headaches. All other systems reviewed and are negative.       Vitals:    02/03/21 1957   BP: (!) 149/79   Pulse: 63   Resp: 16   Temp: 98 °F (36.7 °C)   SpO2: 96%   Weight: 83.7 kg (184 lb 8.4 oz)            Physical Exam   Physical Examination: General appearance - alert, well appearing, and in no distress, oriented to person, place, and time and normal appearing weight  Eyes - pupils equal and reactive, extraocular eye movements intact  Neck - supple, no significant adenopathy  Chest - clear to auscultation, no wheezes, rales or rhonchi, symmetric air entry  Heart - normal rate, regular rhythm, normal S1, S2, no murmurs, rubs, clicks or gallops  Abdomen - soft, nontender, nondistended, no masses or organomegaly  Back exam - full range of motion, no tenderness, palpable spasm or pain on motion  Neurological - alert, oriented, normal speech, no focal findings or movement disorder noted  Musculoskeletal - no joint tenderness, deformity or swelling  Extremities - peripheral pulses normal, no pedal edema, no clubbing or cyanosis  Skin - normal coloration and turgor, no rashes, no suspicious skin lesions noted  MDM  Number of Diagnoses or Management Options     Amount and/or Complexity of Data Reviewed  Clinical lab tests: ordered and reviewed  Decide to obtain previous medical records or to obtain history from someone other than the patient: yes  Review and summarize past medical records: yes  Discuss the patient with other providers: yes [facility])    Patient Progress  Patient progress: stable         Procedures  8:15 PM  Discussed with [facility] with BSMART. She will evaluate patient. Patient medically cleared for psychiatric admission. Patient is voluntary admission to [facility] with accepting physician Dr. Lucia Trevino.

## 2021-02-04 NOTE — ED NOTES
Patient transported to  [facility] via [facility] ambulance. Patient is awake and alert  No signs of distress. Estrella Del Angel called to [facility]. Spoke to Becky Castellanos

## 2021-02-04 NOTE — ED NOTES
Bing from [place] called  Re transfer. Stated she will call [facility] for a bed assignment. Patient fed a snack at this time.

## 2021-02-04 NOTE — PROGRESS NOTES
[facility] Admission Pharmacy Medication Reconciliation     Information obtained from: RxQuery, patient interview  RxQuery data available1: Yes    Comments/recommendations:    1) Medication changes (since last review): Added  Melatonin  Ibuprofen  Adjusted  Frequency and dose added for albuterol inhaler     1RxQuery pharmacy benefit data reflects medications filled and processed through the patient's insurance, however                this data does NOT capture whether the medication was picked up or is currently being taken by the patient. Total Time Spent: 5 minutes    Past Medical History/Disease States:  Past Medical History:   Diagnosis Date    ADHD     Depression     Exercise-induced asthma     Psychiatric disorder     , depression anxiety         Patient allergies: Allergies as of 02/03/2021 - Review Complete 02/03/2021   Allergen Reaction Noted    Oxycodone Nausea and Vomiting 10/25/2018         Prior to Admission Medications   Prescriptions Last Dose Informant Patient Reported? Taking? albuterol (PROVENTIL HFA, VENTOLIN HFA, PROAIR HFA) 90 mcg/actuation inhaler 1/4/2021 at Unknown time  Yes Yes   Sig: Take 2 Puffs by inhalation every six (6) hours as needed for Wheezing or Shortness of Breath. buPROPion XL (WELLBUTRIN XL) 150 mg tablet 2/3/2021 at Unknown time  No Yes   Sig: Take 1 Tab by mouth every morning. Indications: anxiety and depression   ibuprofen (MOTRIN) 200 mg tablet   Yes Yes   Sig: Take 400 mg by mouth every six (6) hours as needed for Pain.   melatonin 5 mg tablet 2/2/2021  Yes Yes   Sig: Take 5 mg by mouth nightly as needed (insomnia).       Facility-Administered Medications: None        Thank you,  LINA Porras [facility] Specialist, [address] [place] [address]  Desk: [phone] ([id number])  Pharmacy: [phone] ([id number])

## 2021-02-04 NOTE — PROGRESS NOTES
Laboratory monitoring for mood stabilizer and antipsychotics:    Recommended baseline monitoring has been completed based on this patient's current medication regimen. The patient is currently taking the following medication(s):   Current Facility-Administered Medications   Medication Dose Route Frequency    buPROPion XL (WELLBUTRIN XL) tablet 300 mg  300 mg Oral 7am    ARIPiprazole (ABILIFY) tablet 2.5 mg  2.5 mg Oral DAILY       Height, Weight, BMI Estimation  Estimated body mass index is 23.11 kg/m² as calculated from the following:    Height as of this encounter: 188 cm (74\"). Weight as of this encounter: 81.6 kg (180 lb). Renal Function, Hepatic Function and Chemistry  Estimated Creatinine Clearance: 112.4 mL/min (based on SCr of 1.16 mg/dL). Lab Results   Component Value Date/Time    Sodium 139 02/03/2021 08:15 PM    Potassium 4.2 02/03/2021 08:15 PM    Chloride 103 02/03/2021 08:15 PM    CO2 30 02/03/2021 08:15 PM    Anion gap 6 02/03/2021 08:15 PM    Glucose 95 02/03/2021 08:15 PM    BUN 16 02/03/2021 08:15 PM    Creatinine 1.16 02/03/2021 08:15 PM    BUN/Creatinine ratio 14 02/03/2021 08:15 PM    GFR est AA >60 02/03/2021 08:15 PM    GFR est non-AA >60 02/03/2021 08:15 PM    Calcium 8.8 02/03/2021 08:15 PM    ALT (SGPT) 29 02/03/2021 08:15 PM    Alk.  phosphatase 69 02/03/2021 08:15 PM    Protein, total 7.5 02/03/2021 08:15 PM    Albumin 4.4 02/03/2021 08:15 PM    Globulin 3.1 02/03/2021 08:15 PM    A-G Ratio 1.4 02/03/2021 08:15 PM    Bilirubin, total 0.6 02/03/2021 08:15 PM       Lab Results   Component Value Date/Time    Glucose 95 02/03/2021 08:15 PM       Lab Results   Component Value Date/Time    Hemoglobin A1c 5.0 01/04/2021 03:45 PM       Hematology  Lab Results   Component Value Date/Time    WBC 5.1 02/03/2021 08:15 PM    HGB 16.0 02/03/2021 08:15 PM    HCT 46.6 02/03/2021 08:15 PM    PLATELET 765 27/15/4350 08:15 PM    MCV 87.8 02/03/2021 08:15 PM       Lipids  Lab Results   Component Value Date/Time    Cholesterol, total 200 (H) 01/04/2021 03:45 PM    HDL Cholesterol 55 01/04/2021 03:45 PM    LDL, calculated 135.6 (H) 01/04/2021 03:45 PM    Triglyceride 47 01/04/2021 03:45 PM    CHOL/HDL Ratio 3.6 01/04/2021 03:45 PM       Thyroid Function    Lab Results   Component Value Date/Time    TSH 2.99 01/04/2021 03:45 PM    T4, Free 1.3 01/04/2021 03:45 PM     Vitals  Visit Vitals  /87 (BP 1 Location: Right upper arm)   Pulse 85   Temp 98 °F (36.7 °C)   Resp 16   Ht 188 cm (74\")   Wt 81.6 kg (180 lb)   SpO2 98%   BMI 23.11 kg/m²       LINA Smith, BCPS  [phone] (pharmacy)

## 2021-02-04 NOTE — ED NOTES
Patient actually spoke to [facility] not [facility] [place] [facility].

## 2021-02-04 NOTE — ED NOTES
Patient spoke to [facility] , awaiting disposition. Very calm and cooperative. Suicidal precautions  Commenced.

## 2021-02-04 NOTE — BH NOTES
GROUP THERAPY PROGRESS NOTE    Patient did not participate in Substance abuse/Coping Skill group.      Franchesca Kraus LPC LSATP CSAC
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
## 2021-02-04 NOTE — ED NOTES
Call received from [facility] [place], patient is accepted at [facility]. Will call report in 10 min.

## 2021-02-05 VITALS
TEMPERATURE: 98.2 F | SYSTOLIC BLOOD PRESSURE: 124 MMHG | OXYGEN SATURATION: 97 % | HEIGHT: 74 IN | RESPIRATION RATE: 16 BRPM | WEIGHT: 180 LBS | DIASTOLIC BLOOD PRESSURE: 76 MMHG | BODY MASS INDEX: 23.1 KG/M2 | HEART RATE: 79 BPM

## 2021-02-05 LAB
SARS-COV-2, XPLCVT: NOT DETECTED
SOURCE, COVRS: NORMAL

## 2021-02-05 PROCEDURE — 90836 PSYTX W PT W E/M 45 MIN: CPT | Performed by: PSYCHIATRY & NEUROLOGY

## 2021-02-05 PROCEDURE — 74011250637 HC RX REV CODE- 250/637: Performed by: NURSE PRACTITIONER

## 2021-02-05 PROCEDURE — 74011250637 HC RX REV CODE- 250/637: Performed by: PSYCHIATRY & NEUROLOGY

## 2021-02-05 PROCEDURE — 99239 HOSP IP/OBS DSCHRG MGMT >30: CPT | Performed by: PSYCHIATRY & NEUROLOGY

## 2021-02-05 RX ORDER — ONDANSETRON HYDROCHLORIDE 4 MG/2ML
4 INJECTION, SOLUTION INTRAMUSCULAR; INTRAVENOUS
Status: CANCELLED | OUTPATIENT
Start: 2021-02-05

## 2021-02-05 RX ORDER — ARIPIPRAZOLE 5 MG/1
5 TABLET ORAL DAILY
Status: DISCONTINUED | OUTPATIENT
Start: 2021-02-06 | End: 2021-02-05 | Stop reason: HOSPADM

## 2021-02-05 RX ORDER — ARIPIPRAZOLE 5 MG/1
5 TABLET ORAL DAILY
Qty: 30 TAB | Refills: 1 | Status: SHIPPED | OUTPATIENT
Start: 2021-02-06 | End: 2021-03-08

## 2021-02-05 RX ORDER — ONDANSETRON 4 MG/1
4 TABLET, ORALLY DISINTEGRATING ORAL
Status: DISCONTINUED | OUTPATIENT
Start: 2021-02-05 | End: 2021-02-05 | Stop reason: HOSPADM

## 2021-02-05 RX ORDER — BUPROPION HYDROCHLORIDE 300 MG/1
300 TABLET ORAL
Qty: 30 TAB | Refills: 1 | Status: SHIPPED | OUTPATIENT
Start: 2021-02-06 | End: 2021-06-21 | Stop reason: SDUPTHER

## 2021-02-05 RX ADMIN — BUPROPION HYDROCHLORIDE 300 MG: 150 TABLET, EXTENDED RELEASE ORAL at 08:54

## 2021-02-05 RX ADMIN — ONDANSETRON 4 MG: 4 TABLET, ORALLY DISINTEGRATING ORAL at 01:14

## 2021-02-05 RX ADMIN — ARIPIPRAZOLE 2.5 MG: 5 TABLET ORAL at 08:55

## 2021-02-05 NOTE — PROGRESS NOTES
Problem: Depressed Mood (Adult/Pediatric)  Goal: *STG: Participates in treatment plan  Outcome: Progressing Towards Goal  Goal: *STG: Participates in 1:1 therapy sessions  Outcome: Progressing Towards Goal  Goal: *STG: Verbalizes anger, guilt, and other feelings in a constructive manor  Outcome: Progressing Towards Goal  Goal: *STG: Attends activities and groups  Outcome: Progressing Towards Goal  Goal: *STG: Demonstrates reduction in symptoms and increase in insight into coping skills/future focused  Outcome: Progressing Towards Goal  Goal: *STG: Remains safe in hospital  Outcome: Progressing Towards Goal  Goal: *STG: Complies with medication therapy  Outcome: Progressing Towards Goal  Goal: *LTG: Returns to previous level of functioning and participates with after care plan  Outcome: Progressing Towards Goal  Goal: *LTG: Understands illness and can identify signs of relapse  Outcome: Progressing Towards Goal  Goal: Interventions  Outcome: Progressing Towards Goal    Patient alert and oriented X4. He has been withdrawn to himself. Patient reported depression and anxiety rated 5/10. He denied SI/HI, AVH, and pain at this time. No ordered PM medications. PRN Atarax 50mg for anxiety and PRN Trazodone 50mg for sleep given at 2227. Vital signs stable and Q15 minute checks continue to maintain safety. RN will continue to monitor.

## 2021-02-05 NOTE — SUICIDE SAFETY PLAN
SAFETY PLAN    A suicide Safety Plan is a document that supports someone when they are having thoughts of suicide. Warning Signs that indicate a suicidal crisis may be developing: What (situations, thoughts, feelings, body sensations, behaviors, etc.) do you experience that lets you know you are beginning to think about suicide? 1. High anxiety  2. Thoughts of self harm  3. Isolation     Internal Coping Strategies:  What things can I do (relaxation techniques, hobbies, physical activities, etc.) to take my mind off my problems without contacting another person? 1. Making music  2. Writing songs  3. Playing video games with friends    People and social settings that provide distraction: Who can I call or where can I go to distract me? 1. Name: José Miguel Sandoval  2. Name: Sister Griselda Hurt whom I can ask for help: Who can I call when I need help - for example, friends, family, clergy, someone else? 1. Name: [address] [place] [address] or [address] [place] [address] I can contact during a crisis: Who can I call for help - for example, my doctor, my psychiatrist, my psychologist, a mental health provider, a suicide hotline? [address]    Phone: [phone]  Crisis numbers: [phone] or [phone]  Fax: [phone]    3. Suicide Prevention Lifeline: 9-325-840-TALK (8611)    4. [address] [place] [address] Emergency Services -  for example, [facility] suicide hotline, [facility] Hotline:  [facility]  [address]    Phone: [phone]  Crisis numbers: [phone] or [phone]  Fax: [phone]    Making the environment safe: How can I make my environment (house/apartment/living space) safer? For example, can I remove guns, medications, and other items? 1. Take medications as prescribed.

## 2021-02-05 NOTE — DISCHARGE INSTRUCTIONS
DISCHARGE SUMMARY from Nurse    PATIENT INSTRUCTIONS    What to do at Home:  Recommended activity: Activity as tolerated,    *  Please give a list of your current medications to your Primary Care Provider. *  Please update this list whenever your medications are discontinued, doses are      changed, or new medications (including over-the-counter products) are added. *  Please carry medication information at all times in case of emergency situations. These are general instructions for a healthy lifestyle:    No smoking/ No tobacco products/ Avoid exposure to second hand smoke  Surgeon General's Warning:  Quitting smoking now greatly reduces serious risk to your health. Obesity, smoking, and sedentary lifestyle greatly increases your risk for illness    A healthy diet, regular physical exercise & weight monitoring are important for maintaining a healthy lifestyle    The discharge information has been reviewed with the patient. The patient verbalized understanding. Discharge medications reviewed with the patient and appropriate educational materials and side effects teaching were provided.   ___________________________________________________________________________________________________________________________________

## 2021-02-05 NOTE — DISCHARGE INSTR - DIET
[employer] DNA TESTING:    [email or web address]. pdf    [email or web address]

## 2021-02-05 NOTE — BH NOTES
Patient alert, verbal and oriented x4. Patient denies feelings of depression, anxiety, SI, HI and AVH. Patient med and meal compliant. Discharge instructions reviewed with patient. Patient verbalized understanding and denied having any questions. Patient belongings and valuables returned to him. Patient transported via family member.      Problem: Depressed Mood (Adult/Pediatric)  Goal: *STG: Participates in treatment plan  Outcome: Progressing Towards Goal  Goal: *STG: Attends activities and groups  Outcome: Not Progressing Towards Goal  Goal: *STG: Complies with medication therapy  Outcome: Progressing Towards Goal

## 2021-02-05 NOTE — PROGRESS NOTES
Patient complained of nausea and vomited a small amount of emesis. SIMÓN Coronel ordered PRN Zofran 4mg for nausea. Patient received PRN Zofran 4mg at 0114. No further complaints of nausea or vomiting. Will continue to monitor.

## 2021-02-05 NOTE — BH NOTES
Behavioral Health Transition Record to Provider    Patient Name: Anitha Macdonald  YOB: 1995  Medical Record Number: [id number]  Date of Admission: 2/4/2021  Date of Discharge: 2/5/2021    Attending Provider: Shwetha Laureano, *  Discharging Provider: Manfred Miguel MD  To contact this individual call [phone] and ask the  to page. If unavailable, ask to be transferred to [address] [place] [address] Provider on call. [facility] Provider will be available on call 24/7 and during holidays. Primary Care Provider: Govind Kelly MD    Allergies   Allergen Reactions    Oxycodone Nausea and Vomiting       Reason for Admission: MDD and SI    Admission Diagnosis: Major depressive disorder, recurrent episode, severe ([facility] [address] [F33.2]    * No surgery found *    Results for orders placed or performed during the hospital encounter of 02/04/21   SARS-COV-2   Result Value Ref Range    SARS-CoV-2 Please find results under separate order         Immunizations administered during this encounter: There is no immunization history on file for this patient. Screening for Metabolic Disorders for Patients on Antipsychotic Medications  (Data obtained from the EMR)    Estimated Body Mass Index  Estimated body mass index is 23.11 kg/m² as calculated from the following:    Height as of this encounter: 6' 2\" (1.88 m). Weight as of this encounter: 81.6 kg (180 lb).      Vital Signs/Blood Pressure  Visit Vitals  /76 (BP 1 Location: Right upper arm, BP Patient Position: Sitting)   Pulse 79   Temp 98.2 °F (36.8 °C)   Resp 16   Ht 6' 2\" (1.88 m)   Wt 81.6 kg (180 lb)   SpO2 97%   BMI 23.11 kg/m²       Blood Glucose/Hemoglobin A1c  Lab Results   Component Value Date/Time    Glucose 95 02/03/2021 08:15 PM       Lab Results   Component Value Date/Time    Hemoglobin A1c 5.0 01/04/2021 03:45 PM        Lipid Panel  Lab Results   Component Value Date/Time    Cholesterol, total 200 (H) 01/04/2021 03:45 PM    HDL Cholesterol 55 01/04/2021 03:45 PM    LDL, calculated 135.6 (H) 01/04/2021 03:45 PM    Triglyceride 47 01/04/2021 03:45 PM    CHOL/HDL Ratio 3.6 01/04/2021 03:45 PM        Discharge Diagnosis: Please refer to physician's discharge summary. Discharge Plan:  The patient Smith Reeves exhibits the ability to control behavior in a less restrictive environment. Patient's level of functioning is improving. No assaultive/destructive behavior has been observed for the past 24 hours. No suicidal/homicidal threat or behavior has been observed for the past 24 hours. There is no evidence of serious medication side effects. Patient has not been in physical or protective restraints for at least the past 24 hours. Family meeting was held today and pt and family are in agreement with discharge plan. Discharge Medication List and Instructions:   Current Discharge Medication List          Unresulted Labs (24h ago, onward)    None        To obtain results of studies pending at discharge, please contact N/A. Follow-up Information     Follow up With Specialties Details Why [address]  Call on 2/8/2021 Please call same day access intake line [phone] between 8:00AM -2:00PM to complete intake assessment for ongoing mental health case management, therapy and medication management. [address] [place] [address]    Phone: [phone]  Crisis numbers: [phone] or [phone]  Fax: [phone]        Advanced Directive:   Does the patient have an appointed surrogate decision maker? Unknown   Does the patient have a Medical Advance Directive? Unknown   Does the patient have a Psychiatric Advance Directive? Unknown   If the patient does not have a surrogate or Medical Advance Directive AND Psychiatric Advance Directive, the patient was offered information on these advance directives.   Unknown     Patient Instructions: Please continue all medications until otherwise directed by physician. Tobacco Cessation Discharge Plan:   Is the patient a smoker and needs referral for smoking cessation? No  Patient referred to the following for smoking cessation with an appointment? No   Patient was offered medication to assist with smoking cessation at discharge? No  Was education for smoking cessation added to the discharge instructions? No     Alcohol/Substance Abuse Discharge Plan:   Does the patient have a history of substance/alcohol abuse and requires a referral for treatment? Yes  Patient referred to the following for substance/alcohol abuse treatment with an appointment? Yes  Patient was offered medication to assist with alcohol cessation at discharge? No  Was education for substance/alcohol abuse added to discharge instructions? Yes     Patient discharged to Home; provided to the patient/caregiver either in hard copy or electronically. Continuing care paperwork was faxed to community mental health providers.

## 2021-02-06 NOTE — DISCHARGE SUMMARY
PSYCHIATRIC DISCHARGE SUMMARY         IDENTIFICATION:    Patient Name  Fidel Rubin   Date of Birth 1995   [facility] [id number]   Medical Record Number  [id number]      Age  22 y.o. PCP Christal Bailey MD   Admit date:  2/4/2021    Discharge date: 2/5/2021   Room Number  320/02  @ [facility]   Date of Service  2/5/2021            TYPE OF DISCHARGE: REGULAR               CONDITION AT DISCHARGE: improved and fair       PROVISIONAL & DISCHARGE DIAGNOSES:    Problem List  Date Reviewed: 1/22/2021          Codes Class    Dysthymia ICD-10-CM: F34.1  ICD-9-CM: 300.4         * (Principal) Severe episode of recurrent major depressive disorder, without psychotic features ([facility] [address] ICD-10-CM: F33.2  ICD-9-CM: 296.33         ADHD ICD-10-CM: F90.9  ICD-9-CM: 314.01         Exercise-induced asthma ICD-10-CM: J45.990  ICD-9-CM: 493.81               Active Hospital Problems    Dysthymia      *Severe episode of recurrent major depressive disorder, without psychotic features ([facility] [address]        DISCHARGE DIAGNOSIS:   Axis I:  SEE ABOVE  Axis II: SEE ABOVE  Axis III: SEE ABOVE  Axis IV:  lack of structure  Axis V:  20 on admission, 55 on discharge     CC & HISTORY OF PRESENT ILLNESS:  \"suicidal ideation\"    The patient, Fidel Rubin, is a 22 y.o. WHITE [place]  male with a past psychiatric history significant for MDD, who presents at this time with complaints of (and/or evidence of) the following emotional symptoms: depression and suicidal thoughts/threats. Additional symptomatology include isolation, insomnia, and poor appetite. The above symptoms have been present for 2+ weeks. These symptoms are of moderate to high severity. These symptoms are constant in nature. The patient's condition has been precipitated by psychosocial stressors. UDS: negative; BAL=0.      Per nursing report, patint admitted due to ongoing SI without plan but with worsening fixation on ways to end his life.  He states that he is having vivid images in his head linked to intrusive thoughts of how he could go about harming himself. He reports a great support system at home. Patient state he started Lexapro recently, and was switched to Wellbutrin due to side effects. He reports poor sleep, anhedonia, and ongoing difficulty with ADLs.    The patient is a good historian. The patient corroborates the above narrative. The patient contracts for safety on the unit and gives consent for the team to contact collateral. The patient is amenable to initiating treatment while on the unit. After a discussion of risk and benefit, the patient agrees to increase his antidepressant and start an atypical antipsychotic for augmentation. 2/05 - patient had episode of NBNB vomiting overnight, slept 7.5 hours, received Atarax and Trazodone for insomnia and anxiety. He denies any significant side effects this morning. Discussed further titration of agents, patient voiced understanding. Family mtg for today to go over discharge recommendations, potential for interventional modalities (ECT, [address] [place]) as well as DNA testing to ascertain favorable agents.        SOCIAL HISTORY:    Social History     Socioeconomic History    Marital status: SINGLE     Spouse name: Not on file    Number of children: Not on file    Years of education: Not on file    Highest education level: Not on file   Occupational History    Not on file   Social Needs    Financial resource strain: Not on file    Food insecurity     Worry: Not on file     Inability: Not on file    Transportation needs     Medical: Not on file     Non-medical: Not on file   Tobacco Use    Smoking status: Former Smoker    Smokeless tobacco: Never Used    Tobacco comment: 1ppd from age [age], quit in 2020   Substance and Sexual Activity    Alcohol use: Not Currently     Comment: none since 10/2019; 12-15 drinks/week previously    Drug use: Yes     Types: Marijuana, Cocaine, Other     Comment: LSD also, mushrooms, none since 10/2019    Sexual activity: Not Currently     Partners: Female   Lifestyle    Physical activity     Days per week: Not on file     Minutes per session: Not on file    Stress: Not on file   Relationships    Social connections     Talks on phone: Not on file     Gets together: Not on file     Attends Orthodoxy service: Not on file     Active member of club or organization: Not on file     Attends meetings of clubs or organizations: Not on file     Relationship status: Not on file    Intimate partner violence     Fear of current or ex partner: Not on file     Emotionally abused: Not on file     Physically abused: Not on file     Forced sexual activity: Not on file   Other Topics Concern    Not on file   Social History Narrative    Not on file      FAMILY HISTORY:   Family History   Problem Relation Age of Onset    No Known Problems Mother     No Known Problems Father              HOSPITALIZATION COURSE:    Flower Ruggiero was admitted to the inpatient psychiatric unit [facility] for acute psychiatric stabilization in regards to symptomatology as described in the HPI above. The differential diagnosis at time of admission included: MDD vs INDIO. While on the unit Flower Ruggiero was involved in individual, group, occupational and milieu therapy. Psychiatric medications were adjusted during this hospitalization including Wellbutrin and Ability. Flower Ruggiero demonstrated a slow, but progressive improvement in overall condition. Much of patient's initial presentation appeared to be related to situational stressors and psychological factors. Please see individual progress notes for more specific details regarding patient's hospitalization course. Patient with request for discharge today. There are no grounds to seek a TDO. At time of discharge, Flower Ruggiero is without significant problems of depression, psychosis, or geneva.  Patient free of suicidal and homicidal ideations (appears to be a chronic, elevated risk of suicide or homicide) but reports many positive predictive factors in terms of not attempting suicide or homicide. Overall presentation at time of discharge is most consistent with the diagnosis of major depressive disorder, severe without psychosis. Patient has maximized benefit to be derived from acute inpatient psychiatric treatment. All members of the treatment team concur with each other in regards to plans for discharge today. Patient and family are aware and in agreement with discharge and discharge plan.          LABS AND IMAGAING:    Labs Reviewed   SARS-COV-2   SARS-COV-2     No results found for: DS35, PHEN, PHENO, PHENT, DILF, DS39, PHENY, PTN, VALF2, VALAC, VALP, VALPR, DS6, CRBAM, CRBAMP, CARB2, XCRBAM  Admission on 02/04/2021, Discharged on 02/05/2021   Component Date Value Ref Range Status    SARS-CoV-2 02/04/2021 Please find results under separate order    Final    Specimen source 02/04/2021 Nasopharyngeal    Final    SARS-CoV-2 02/04/2021 Not detected  NOTD   Final   Admission on 02/03/2021, Discharged on 02/04/2021   Component Date Value Ref Range Status    WBC 02/03/2021 5.1  4.1 - 11.1 K/uL Final    RBC 02/03/2021 5.31  4.10 - 5.70 M/uL Final    HGB 02/03/2021 16.0  12.1 - 17.0 g/dL Final    HCT 02/03/2021 46.6  36.6 - 50.3 % Final    MCV 02/03/2021 87.8  80.0 - 99.0 FL Final    MCH 02/03/2021 30.1  26.0 - 34.0 PG Final    MCHC 02/03/2021 34.3  30.0 - 36.5 g/dL Final    RDW 02/03/2021 11.7  11.5 - 14.5 % Final    PLATELET 29/27/9936 922  150 - 400 K/uL Final    MPV 02/03/2021 10.7  8.9 - 12.9 FL Final    NRBC 02/03/2021 0.0  0.0  WBC Final    ABSOLUTE NRBC 02/03/2021 0.00  0.00 - 0.01 K/uL Final    NEUTROPHILS 02/03/2021 37  32 - 75 % Final    LYMPHOCYTES 02/03/2021 48  12 - 49 % Final    MONOCYTES 02/03/2021 9  5 - 13 % Final    EOSINOPHILS 02/03/2021 5  0 - 7 % Final    BASOPHILS 02/03/2021 1  0 - 1 % Final    IMMATURE GRANULOCYTES 02/03/2021 0  0 - 0.5 % Final    ABS. NEUTROPHILS 02/03/2021 1.9  1.8 - 8.0 K/UL Final    ABS. LYMPHOCYTES 02/03/2021 2.5  0.8 - 3.5 K/UL Final    ABS. MONOCYTES 02/03/2021 0.4  0.0 - 1.0 K/UL Final    ABS. EOSINOPHILS 02/03/2021 0.3  0.0 - 0.4 K/UL Final    ABS. BASOPHILS 02/03/2021 0.0  0.0 - 0.1 K/UL Final    ABS. IMM. GRANS. 02/03/2021 0.0  0.00 - 0.04 K/UL Final    DF 02/03/2021 AUTOMATED    Final    Sodium 02/03/2021 139  136 - 145 mmol/L Final    Potassium 02/03/2021 4.2  3.5 - 5.1 mmol/L Final    Chloride 02/03/2021 103  97 - 108 mmol/L Final    CO2 02/03/2021 30  21 - 32 mmol/L Final    Anion gap 02/03/2021 6  5 - 15 mmol/L Final    Glucose 02/03/2021 95  65 - 100 mg/dL Final    BUN 02/03/2021 16  6 - 20 MG/DL Final    Creatinine 02/03/2021 1.16  0.70 - 1.30 MG/DL Final    BUN/Creatinine ratio 02/03/2021 14  12 - 20   Final    GFR est AA 02/03/2021 >60  >60 ml/min/1.73m2 Final    GFR est non-AA 02/03/2021 >60  >60 ml/min/1.73m2 Final    Calcium 02/03/2021 8.8  8.5 - 10.1 MG/DL Final    Bilirubin, total 02/03/2021 0.6  0.2 - 1.0 MG/DL Final    ALT (SGPT) 02/03/2021 29  12 - 78 U/L Final    AST (SGOT) 02/03/2021 19  15 - 37 U/L Final    Alk.  phosphatase 02/03/2021 69  45 - 117 U/L Final    Protein, total 02/03/2021 7.5  6.4 - 8.2 g/dL Final    Albumin 02/03/2021 4.4  3.5 - 5.0 g/dL Final    Globulin 02/03/2021 3.1  2.0 - 4.0 g/dL Final    A-G Ratio 02/03/2021 1.4  1.1 - 2.2   Final    AMPHETAMINES 02/03/2021 Negative  NEG   Final    BARBITURATES 02/03/2021 Negative  NEG   Final    BENZODIAZEPINES 02/03/2021 Negative  NEG   Final    COCAINE 02/03/2021 Negative  NEG   Final    METHADONE 02/03/2021 Negative  NEG   Final    OPIATES 02/03/2021 Negative  NEG   Final    PCP(PHENCYCLIDINE) 02/03/2021 Negative  NEG   Final    THC (TH-CANNABINOL) 02/03/2021 Negative  NEG   Final    Drug screen comment 02/03/2021 (NOTE)   Final    ALCOHOL(ETHYL),SERUM 02/03/2021 <10  <10 MG/DL Final    SARS-CoV-2 02/03/2021 Please find results under separate order    Final    Specimen source 02/03/2021 Nasopharyngeal    Final    COVID-19 rapid test 02/03/2021 Not detected  NOTD   Final    Color 02/03/2021 YELLOW/STRAW    Final    Appearance 02/03/2021 CLEAR  CLEAR   Final    Specific gravity 02/03/2021 1.025  1.003 - 1.030   Final    pH (UA) 02/03/2021 6.0  5.0 - 8.0   Final    Protein 02/03/2021 Negative  NEG mg/dL Final    Glucose 02/03/2021 Negative  NEG mg/dL Final    Ketone 02/03/2021 Negative  NEG mg/dL Final    Bilirubin 02/03/2021 Negative  NEG   Final    Blood 02/03/2021 Negative  NEG   Final    Urobilinogen 02/03/2021 0.2  0.2 - 1.0 EU/dL Final    Nitrites 02/03/2021 Negative  NEG   Final    Leukocyte Esterase 02/03/2021 Negative  NEG   Final    WBC 02/03/2021 0-4  0 - 4 /hpf Final    RBC 02/03/2021 0-5  0 - 5 /hpf Final    Epithelial cells 02/03/2021 FEW  FEW /lpf Final    Bacteria 02/03/2021 Negative  NEG /hpf Final     No results found. DISPOSITION:    Home. Patient to f/u with psychiatric and psychotherapy appointments. FOLLOW-UP CARE:    Activity as tolerated  Regular diet  Wound Care: none needed. Follow-up Information     Follow up With Specialties Details Why [address]  Call on 2/8/2021 Please call same day access intake line [phone] between 8:00AM -2:00PM to complete intake assessment for ongoing mental health case management, therapy and medication management. [address] [facility] [address]    Phone: [phone]  Crisis numbers: [phone] or [phone]  Fax: [phone] Associates    Address: [address]  Phone: [phone]    [facility]    Address: [address] [place] [address]  Phone: [phone]    Elena Cabrera.  Angie Azevedo MD    Address: [address] [place], [place] [address], [address]rianne Cuello, [address]  Phone: [phone]    Mai Bennett MD    Address: [facility], [address] [facility], [address]  Phone: [phone]    Pan Persaud MD Internal Medicine   [address] [place]  [address]  [address] [place] [address] [phone]                   PROGNOSIS:   Westly Pac ---- based on nature of patient's pathology/ies and treatment compliance issues. Prognosis is greatly dependent upon patient's ability to remain sober and to follow up with scheduled appointments as well as to comply with psychiatric medications as prescribed. DISCHARGE MEDICATIONS:     Informed consent given for the use of following psychotropic medications:  Discharge Medication List as of 2/5/2021  2:33 PM      START taking these medications    Details   ARIPiprazole (ABILIFY) 5 mg tablet Take 1 Tab by mouth daily. Indications: additional medications to treat depression, Normal, Disp-30 Tab, R-1         CONTINUE these medications which have CHANGED    Details   buPROPion XL (WELLBUTRIN XL) 300 mg XL tablet Take 1 Tab by mouth every morning. Indications: major depressive disorder, Normal, Disp-30 Tab, R-1         CONTINUE these medications which have NOT CHANGED    Details   melatonin 5 mg tablet Take 5 mg by mouth nightly as needed (insomnia). , Historical Med      ibuprofen (MOTRIN) 200 mg tablet Take 400 mg by mouth every six (6) hours as needed for Pain., Historical Med      albuterol (PROVENTIL HFA, VENTOLIN HFA, PROAIR HFA) 90 mcg/actuation inhaler Take 2 Puffs by inhalation every six (6) hours as needed for Wheezing or Shortness of Breath., Historical Med                    A coordinated, multidisplinary treatment team round was conducted with Mylene Alcantar is done daily here at [facility]. This team consists of the nurse, psychiatric unit pharmacist,  and writer. I have spent greater than 35 minutes on discharge work.     Signed:  Su Almodovar MD  2/5/2021

## 2021-02-06 NOTE — BH NOTES
PSYCHOTHERAPY SESSION NOTE     Patient Name  Magnus Barajas   Date of Birth 1995   [facility] [id number]   Medical Record Number  [id number]      Age  22 y.o. PCP Allen Malin MD   Admit date:  2/4/2021    Room Number  320/02  @ [facility]   Date of Service  2/5/2021            Length of psychotherapy session: 45 minutes    Main condition/diagnosis/issues treated during session today, 2/5/2021 : major depressive disorder, recurrent severe    I employed Cognitive Behavioral therapy techniques, Reality-Oriented psychotherapy, as well as supportive psychotherapy in regards to various ongoing psychosocial stressors, including the following: pre-admission and current problems; occupational issues; academic issues; and stress of hospitalization. Interpersonal relationship issues and psychodynamic conflicts explored. Attempts made to alleviate maladaptive patterns. Patient's mother and sister attended session via telephone due to [place] restrictions. Session focused on the patient's experiences thus far with living with clinical depression, as well as his family history. MD went through typical treatment course and potential interventions if symptoms do not subside in a reasonable time frame. Overall, patient is progressing. Treatment Plan Update (reviewed and updated 2/5/2021) : I will modify psychotherapy tx plan by implementing more stress management strategies, building upon cognitive behavioral techniques, increasing coping skills, as well as shoring up psychological defenses).

## 2021-03-07 NOTE — PROGRESS NOTES
Consent: Ofelia Rodriguez, who was seen by synchronous (real-time) audio-video technology, and/or his healthcare decision maker, is aware that this patient-initiated, Telehealth encounter on 3/8/2021 is a billable service, with coverage as determined by his insurance carrier. He is aware that he may receive a bill and has provided verbal consent to proceed: Yes. I was in the office while conducting this encounter. Patient identification was verified at the start of the visit: YES  This visit was done with doxy. me  The patient is located in [place] during this visit    Assessment and Plan   Diagnoses and all orders for this visit:  1. Severe episode of recurrent major depressive disorder, without psychotic features [facility])  Was admitted early February for suicidal ideation. Now following with [facility] services Dr. Vann Lesches. Now currently on Wellbutrin 300 mg daily and Latuda 20 mg daily. He had been on Abilify after his discharge last month but reports feeling restless and his arm felt weird. The symptoms have resolved after starting Latuda. Denies any current suicidal ideation. Reports feeling better since his admission although \"still do not feel great but I have more energy. \"    Do not recommend any changes to his medications. Continue Wellbutrin 300 daily and Latuda 20 mg daily        We discussed the expected course, resolution and complications of the diagnosis(es) in detail. Medication risks, benefits, costs, interactions, and alternatives were discussed as indicated. I advised him to contact the office if his condition worsens, changes or fails to improve as anticipated. He expressed understanding with the diagnosis(es) and plan.      Return to clinic:  1 mo    Jose Miguel Fisher MD  [facility]  3/8/2021    Future Appointments   Date Time Provider Abad Strong   7/0/7533  4:64 AM Anthony Cruz MD [facility] BS AMB        Subjective   Chief Complaint   Dexter Bee is a 26 y.o. male         Objective   Vitals:       Patient-Reported Vitals 3/8/2021   Patient-Reported Weight 188                 Physical Exam  Constitutional:       Appearance: Normal appearance. He is not ill-appearing.   Pulmonary:      Effort: No respiratory distress.   Neurological:      Mental Status: He is alert.          Current Outpatient Medications   Medication Sig   • lurasidone (Latuda) 20 mg tab tablet Take 20 mg by mouth daily (with dinner).   • buPROPion XL (WELLBUTRIN XL) 300 mg XL tablet Take 1 Tab by mouth every morning. Indications: major depressive disorder   • melatonin 5 mg tablet Take 5 mg by mouth nightly as needed (insomnia).   • ibuprofen (MOTRIN) 200 mg tablet Take 400 mg by mouth every six (6) hours as needed for Pain.   • albuterol (PROVENTIL HFA, VENTOLIN HFA, PROAIR HFA) 90 mcg/actuation inhaler Take 2 Puffs by inhalation every six (6) hours as needed for Wheezing or Shortness of Breath.     No current facility-administered medications for this visit.        Amandeep Bee is a 26 y.o. male being evaluated by a video visit encounter for concerns as above.  A caregiver was present when appropriate. Due to this being a TeleHealth encounter (During COVID-19 public health emergency), evaluation of the following organ systems was limited: Vitals/Constitutional/EENT/Resp/CV/GI//MS/Neuro/Skin/Heme-Lymph-Imm.  Pursuant to the emergency declaration under the [place] Act and the National Emergencies Act, 1135 waiver authority and the Coronavirus Preparedness and Response Supplemental Appropriations Act, this Virtual  Visit was conducted, with patient's (and/or legal guardian's) consent, to reduce the patient's risk of exposure to COVID-19 and provide necessary medical care.     Services were provided through a video synchronous discussion virtually to substitute for in-person clinic visit.

## 2021-03-08 ENCOUNTER — VIRTUAL VISIT (OUTPATIENT)
Dept: INTERNAL MEDICINE CLINIC | Age: 26
End: 2021-03-08
Payer: MEDICAID

## 2021-03-08 DIAGNOSIS — F33.2 SEVERE EPISODE OF RECURRENT MAJOR DEPRESSIVE DISORDER, WITHOUT PSYCHOTIC FEATURES (HCC): ICD-10-CM

## 2021-03-08 PROCEDURE — 99213 OFFICE O/P EST LOW 20 MIN: CPT | Performed by: INTERNAL MEDICINE

## 2021-04-04 NOTE — PROGRESS NOTES
Consent: Laurita Olsen, who was seen by synchronous (real-time) audio-video technology, and/or his healthcare decision maker, is aware that this patient-initiated, Telehealth encounter on 4/5/2021 is a billable service, with coverage as determined by his insurance carrier. He is aware that he may receive a bill and has provided verbal consent to proceed: Yes. I was in the office while conducting this encounter. Patient identification was verified at the start of the visit: YES  This visit was done with doxy. me  The patient is located in [place] during this visit    Assessment and Plan   Diagnoses and all orders for this visit:    1. Severe episode of recurrent major depressive disorder, without psychotic features ([facility] [address]  Has been followed by [facility] services. Latuda increase since last visit to 40 mg. Also on Wellbutrin 300 mg daily. Reports that his past week has been better but \"still not super great. \"  Interested in exploring other options for psychiatry and therapy. Options sent through my chart. There are also considering starting him on Adderall 10 mg twice a day for his ADHD. No medication changes recommended. Follow-up with psychiatry. We discussed the expected course, resolution and complications of the diagnosis(es) in detail. Medication risks, benefits, costs, interactions, and alternatives were discussed as indicated. I advised him to contact the office if his condition worsens, changes or fails to improve as anticipated. He expressed understanding with the diagnosis(es) and plan.      Return to clinic: As scheduled as requested by patient    Bay Maldonado MD  [facility]  4/5/2021    Future Appointments   Date Time Provider Abad Strong   5/98/1720  0:45 AM Naina Heart MD [facility] AMB        Subjective   Chief Complaint   Depression    Laurita Olsen is a 32 y.o. male         Objective   Vitals: Patient-Reported Vitals 3/8/2021   Patient-Reported Weight 188                 Physical Exam  Constitutional:       Appearance: Normal appearance. He is not ill-appearing. Pulmonary:      Effort: No respiratory distress. Neurological:      Mental Status: He is alert. Current Outpatient Medications   Medication Sig    dextroamphetamine-amphetamine (AdderalL) 10 mg tablet Take 10 mg by mouth two (2) times a day.  lurasidone (Latuda) 40 mg tab tablet Take 40 mg by mouth daily (with dinner).  buPROPion XL (WELLBUTRIN XL) 300 mg XL tablet Take 1 Tab by mouth every morning. Indications: major depressive disorder    ibuprofen (MOTRIN) 200 mg tablet Take 400 mg by mouth every six (6) hours as needed for Pain.  albuterol (PROVENTIL HFA, VENTOLIN HFA, PROAIR HFA) 90 mcg/actuation inhaler Take 2 Puffs by inhalation every six (6) hours as needed for Wheezing or Shortness of Breath.  melatonin 5 mg tablet Take 5 mg by mouth nightly as needed (insomnia). No current facility-administered medications for this visit. Feroz Wise is a 32 y.o. male being evaluated by a video visit encounter for concerns as above. A caregiver was present when appropriate. Due to this being a TeleHealth encounter (During COIKM-27 public health emergency), evaluation of the following organ systems was limited: Vitals/Constitutional/EENT/Resp/CV/GI//MS/Neuro/Skin/Heme-Lymph-Imm. Pursuant to the emergency declaration under the [facility] [address] [facility], 1135 waiver authority and the [facility] and [employer] Act, this Virtual  Visit was conducted, with patient's (and/or legal guardian's) consent, to reduce the patient's risk of exposure to COVID-19 and provide necessary medical care. Services were provided through a video synchronous discussion virtually to substitute for in-person clinic visit.

## 2021-04-05 ENCOUNTER — VIRTUAL VISIT (OUTPATIENT)
Dept: INTERNAL MEDICINE CLINIC | Age: 26
End: 2021-04-05
Payer: MEDICAID

## 2021-04-05 DIAGNOSIS — F33.2 SEVERE EPISODE OF RECURRENT MAJOR DEPRESSIVE DISORDER, WITHOUT PSYCHOTIC FEATURES (HCC): Primary | ICD-10-CM

## 2021-04-05 PROCEDURE — 99214 OFFICE O/P EST MOD 30 MIN: CPT | Performed by: INTERNAL MEDICINE

## 2021-04-05 RX ORDER — DEXTROAMPHETAMINE SACCHARATE, AMPHETAMINE ASPARTATE, DEXTROAMPHETAMINE SULFATE AND AMPHETAMINE SULFATE 2.5; 2.5; 2.5; 2.5 MG/1; MG/1; MG/1; MG/1
10 TABLET ORAL 2 TIMES DAILY
COMMUNITY
End: 2021-06-24 | Stop reason: SDUPTHER

## 2021-05-28 ENCOUNTER — VIRTUAL VISIT (OUTPATIENT)
Dept: INTERNAL MEDICINE CLINIC | Age: 26
End: 2021-05-28

## 2021-05-28 NOTE — PROGRESS NOTES
This encounter was created in error - please disregard. Pt didn't respond to 2 texts; phone goes straight to voicemail.   LVM to call back

## 2021-06-21 DIAGNOSIS — F33.2 SEVERE EPISODE OF RECURRENT MAJOR DEPRESSIVE DISORDER, WITHOUT PSYCHOTIC FEATURES (HCC): Primary | ICD-10-CM

## 2021-06-21 RX ORDER — BUPROPION HYDROCHLORIDE 300 MG/1
300 TABLET ORAL
Qty: 90 TABLET | Refills: 1 | Status: SHIPPED | OUTPATIENT
Start: 2021-06-21

## 2021-06-24 DIAGNOSIS — F90.9 ATTENTION DEFICIT HYPERACTIVITY DISORDER (ADHD), UNSPECIFIED ADHD TYPE: Primary | ICD-10-CM

## 2021-06-24 RX ORDER — DEXTROAMPHETAMINE SACCHARATE, AMPHETAMINE ASPARTATE, DEXTROAMPHETAMINE SULFATE AND AMPHETAMINE SULFATE 2.5; 2.5; 2.5; 2.5 MG/1; MG/1; MG/1; MG/1
10 TABLET ORAL 3 TIMES DAILY
Qty: 90 TABLET | Refills: 0 | Status: SHIPPED | OUTPATIENT
Start: 2021-06-24 | End: 2022-10-10 | Stop reason: DRUGHIGH

## 2021-06-24 RX ORDER — DEXTROAMPHETAMINE SACCHARATE, AMPHETAMINE ASPARTATE, DEXTROAMPHETAMINE SULFATE AND AMPHETAMINE SULFATE 2.5; 2.5; 2.5; 2.5 MG/1; MG/1; MG/1; MG/1
10 TABLET ORAL 3 TIMES DAILY
Qty: 90 TABLET | Refills: 0 | Status: SHIPPED | OUTPATIENT
Start: 2021-08-23 | End: 2022-10-10 | Stop reason: ALTCHOICE

## 2021-06-24 RX ORDER — DEXTROAMPHETAMINE SACCHARATE, AMPHETAMINE ASPARTATE, DEXTROAMPHETAMINE SULFATE AND AMPHETAMINE SULFATE 2.5; 2.5; 2.5; 2.5 MG/1; MG/1; MG/1; MG/1
10 TABLET ORAL 3 TIMES DAILY
Qty: 90 TABLET | Refills: 0 | Status: SHIPPED | OUTPATIENT
Start: 2021-07-24 | End: 2022-10-10 | Stop reason: ALTCHOICE

## 2021-07-06 DIAGNOSIS — J45.990 EXERCISE-INDUCED ASTHMA: Primary | ICD-10-CM

## 2021-07-06 RX ORDER — ALBUTEROL SULFATE 90 UG/1
2 AEROSOL, METERED RESPIRATORY (INHALATION)
Qty: 1 INHALER | Refills: 5 | Status: SHIPPED | OUTPATIENT
Start: 2021-07-06 | End: 2022-04-28 | Stop reason: SDUPTHER

## 2021-09-14 NOTE — PROGRESS NOTES
Note   Chief Complaint   insomnia    Ray Santos is a 32 y.o. male     1. Insomnia, unspecified type  Assessment & Plan:  Reports severe insomnia ongoing since the summer. Reports that he could go a couple days without sleeping. Does not feel tired. When he is able to fall asleep late at night, he ends up waking up early because of the noise in the house. Recommend starting Seroquel 25 mg at night to help both with sleep and his depression. Advised to call if any issues prior to next visit  2. Severe episode of recurrent major depressive disorder, without psychotic features [facility])  Assessment & Plan:  1/4/21 - Reports severe depression symptoms associated with anxiety. Has also noted increased paranoia about \"literally everything. \"  Has thoughts about people talking about him behind his back or people thinking he is a suspicious person. Reports he had his friends have a \"bad history\" with law enforcement and is suspicious of undercover . Reports that his \"thoughts are illogical because there is no concrete evidence. \"  Reports having a negative view of the world. Denies any visual hallucinations. Reports a history of auditory hallucinations but thinks these may be related to drug use in the past which includes cocaine, marijuana, LSD, mushrooms. He also previously drank 12-15 drinks a week of alcohol. He has not had any alcohol or recreational drugs since October 2019. Reports that his depressive symptoms have gotten worse after stopping substance use. Denies any episodes of [place] but reports his mom says he is \"up-and-down\" a lot.     Patient interested in psychiatry counseling referral.  PHQ-9 score is 12. Recommend starting Lexapro for depression and anxiety. List of places provided to patient for psychiatry and counseling. Also recommend calling his insurance to get a list of covered providers. 1/22/21 - Was started on Lexapro at this visit.   However, 2 days ago, he woke up with severe anxiety and fatigue and weakness. He went to the emergency room. Blood work at that time was negative. He stopped taking Lexapro after this visit. Symptoms have somewhat improved although \"I still feel good. \"  Denies any fever, chills, cough, shortness of breath, palpitations, abdominal pain, nausea, vomiting, diarrhea, constipation. Denies any new stressors. Sleeps about 10-12 hours at night and feels well rested. Felt worse on Lexapro.     Reports that he was previously on Wellbutrin and tolerated that medication well. He is interested in retrying this. Denies a history of seizures or eating disorders.     We will start Wellbutrin 150 mg daily. Given information for online counseling. He is supposed to call Psychiatric again to see if they have any openings for appointments. 3/8/21 - Was admitted early February for suicidal ideation. Now following with [facility] services Dr. Guillermina Arango. Now currently on Wellbutrin 300 mg daily and Latuda 20 mg daily. He had been on Abilify after his discharge last month but reports feeling restless and his arm felt weird. The symptoms have resolved after starting Latuda. Denies any current suicidal ideation. Reports feeling better since his admission although \"still do not feel great but I have more energy. \"     Do not recommend any changes to his medications. Continue Wellbutrin 300 daily and Latuda 20 mg daily    4/5/21 - Has been followed by [address] [place] [address] services. Latuda increase since last visit to 40 mg. Also on Wellbutrin 300 mg daily. Reports that his past week has been better but \"still not super great. \"  Interested in exploring other options for psychiatry and therapy. Options sent through my chart. There are also considering starting him on Adderall 10 mg twice a day for his ADHD. No medication changes recommended.   Follow-up with psychiatry.  =============================    Since last visit, he stopped his Beny Cornea because it made him feel restless. Continues to have depression. Denies any suicidal ideation. He is interested in switching psychiatrists. Continue Wellbutrin 300 mg daily. Recommend starting Seroquel 25 mg at night to help both with sleep and depression. Information for other psych offices provided  Orders:  -     QUEtiapine (SEROquel) 25 mg tablet; Take 1 Tablet by mouth nightly. Indications: depression, sleep, Normal, Disp-30 Tablet, R-1  3. Exercise-induced asthma  Assessment & Plan:  Reports noting worsening shortness of breath with activity and feels like his asthma is not as well controlled. Uses albuterol prior to activity. Recommend starting Symbicort and using albuterol as needed  Orders:  -     budesonide-formoteroL (Symbicort) 80-4.5 mcg/actuation HFAA; Take 2 Puffs by inhalation two (2) times a day. Rinse mouth after use  Indications: asthma, Normal, Disp-10.2 g, R-3  4. Screen for STD (sexually transmitted disease)  -     CT/NG/T.VAGINALIS AMPLIFICATION; Future  -     HIV 1/2 AG/AB, 4TH GENERATION,W RFLX CONFIRM; Future  -     T PALLIDUM SCREEN W/REFLEX; Future  -     HEPATITIS C AB; Future  -     HEP B SURFACE AG; Future  -     HBV CORE AB, IGG/IGM; Future  -     HEP B SURFACE AB; Future  5. Multiple nevi  Assessment & Plan:  Referral for skin check provided  Orders:  -     REFERRAL TO DERMATOLOGY  6. Attention deficit hyperactivity disorder (ADHD), unspecified ADHD type  Assessment & Plan:  1/4/21 - Diagnosed as a child. Not currently on any medications. Continue to monitor     4/5/21 - started on adderall by psych  =============  Reports taking Adderall 20 mg in the morning and 10 mg in the afternoon. He will sometimes skip his afternoon dose. Does notice improvement when he is taking the medication. Recommend stopping Adderall temporarily to see if that is causing any sleep issues     Patient requesting STD testing. Provided verbal permission for HIV testing.   Phone number in chart is correct    Benefits, risks, possible drug interactions, and side effects of all new medications were reviewed with the patient. Pt verbalized understanding. Return to clinic: 1 month for Seroquel, Symbicort    An electronic signature was used to authenticate this note. Sal Neves MD  [facility]  9/15/2021    No future appointments. Objective   Vitals:       Visit Vitals  /76 (BP 1 Location: Left upper arm, BP Patient Position: Sitting, BP Cuff Size: Adult)   Pulse 73   Temp 97.3 °F (36.3 °C) (Temporal)   Resp 14   Ht 6' 2\" (1.88 m)   Wt 174 lb (78.9 kg)   SpO2 95%   BMI 22.34 kg/m²        Physical Exam  Constitutional:       Comments: Flat affect; wearing sunglasses   Cardiovascular:      Rate and Rhythm: Normal rate and regular rhythm. Heart sounds: No murmur heard. No friction rub. No gallop. Pulmonary:      Effort: No respiratory distress. Breath sounds: Normal breath sounds. No wheezing, rhonchi or rales. Neurological:      Mental Status: He is alert. Current Outpatient Medications   Medication Sig    budesonide-formoteroL (Symbicort) 80-4.5 mcg/actuation HFAA Take 2 Puffs by inhalation two (2) times a day. Rinse mouth after use  Indications: asthma    QUEtiapine (SEROquel) 25 mg tablet Take 1 Tablet by mouth nightly. Indications: depression, sleep    albuterol (PROVENTIL HFA, VENTOLIN HFA, PROAIR HFA) 90 mcg/actuation inhaler Take 2 Puffs by inhalation every six (6) hours as needed for Wheezing or Shortness of Breath. Indications: asthma attack    dextroamphetamine-amphetamine (AdderalL) 10 mg tablet Take 1 Tablet by mouth three (3) times daily. Max Daily Amount: 30 mg.    dextroamphetamine-amphetamine (AdderalL) 10 mg tablet Take 1 Tablet by mouth three (3) times daily. Max Daily Amount: 30 mg.    dextroamphetamine-amphetamine (AdderalL) 10 mg tablet Take 1 Tablet by mouth three (3) times daily.  Max Daily Amount: 30 mg.    buPROPion XL (WELLBUTRIN XL) 300 mg XL tablet Take 1 Tablet by mouth every morning. Indications: major depressive disorder    melatonin 5 mg tablet Take 5 mg by mouth nightly as needed (insomnia).  ibuprofen (MOTRIN) 200 mg tablet Take 400 mg by mouth every six (6) hours as needed for Pain. No current facility-administered medications for this visit.

## 2021-09-15 ENCOUNTER — OFFICE VISIT (OUTPATIENT)
Dept: INTERNAL MEDICINE CLINIC | Age: 26
End: 2021-09-15
Payer: MEDICAID

## 2021-09-15 VITALS
BODY MASS INDEX: 22.33 KG/M2 | WEIGHT: 174 LBS | DIASTOLIC BLOOD PRESSURE: 76 MMHG | TEMPERATURE: 97.3 F | HEIGHT: 74 IN | SYSTOLIC BLOOD PRESSURE: 115 MMHG | OXYGEN SATURATION: 95 % | HEART RATE: 73 BPM | RESPIRATION RATE: 14 BRPM

## 2021-09-15 DIAGNOSIS — F33.2 SEVERE EPISODE OF RECURRENT MAJOR DEPRESSIVE DISORDER, WITHOUT PSYCHOTIC FEATURES (HCC): ICD-10-CM

## 2021-09-15 DIAGNOSIS — Z11.3 SCREEN FOR STD (SEXUALLY TRANSMITTED DISEASE): ICD-10-CM

## 2021-09-15 DIAGNOSIS — F90.9 ATTENTION DEFICIT HYPERACTIVITY DISORDER (ADHD), UNSPECIFIED ADHD TYPE: ICD-10-CM

## 2021-09-15 DIAGNOSIS — J45.990 EXERCISE-INDUCED ASTHMA: ICD-10-CM

## 2021-09-15 DIAGNOSIS — D22.9 MULTIPLE NEVI: ICD-10-CM

## 2021-09-15 DIAGNOSIS — G47.00 INSOMNIA, UNSPECIFIED TYPE: Primary | ICD-10-CM

## 2021-09-15 PROBLEM — Z00.00 ROUTINE ADULT HEALTH MAINTENANCE: Status: ACTIVE | Noted: 2021-09-15

## 2021-09-15 PROBLEM — N50.812 PAIN IN LEFT TESTICLE: Status: ACTIVE | Noted: 2021-09-15

## 2021-09-15 PROCEDURE — 99214 OFFICE O/P EST MOD 30 MIN: CPT | Performed by: INTERNAL MEDICINE

## 2021-09-15 RX ORDER — BUDESONIDE AND FORMOTEROL FUMARATE DIHYDRATE 80; 4.5 UG/1; UG/1
2 AEROSOL RESPIRATORY (INHALATION) 2 TIMES DAILY
Qty: 10.2 G | Refills: 3 | Status: SHIPPED | OUTPATIENT
Start: 2021-09-15 | End: 2022-04-28

## 2021-09-15 RX ORDER — QUETIAPINE FUMARATE 25 MG/1
25 TABLET, FILM COATED ORAL
Qty: 30 TABLET | Refills: 1 | Status: SHIPPED | OUTPATIENT
Start: 2021-09-15 | End: 2022-04-20 | Stop reason: SDUPTHER

## 2021-09-15 NOTE — ASSESSMENT & PLAN NOTE
1/4/21 - Reports severe depression symptoms associated with anxiety. Has also noted increased paranoia about \"literally everything. \"  Has thoughts about people talking about him behind his back or people thinking he is a suspicious person. Reports he had his friends have a \"bad history\" with law enforcement and is suspicious of undercover . Reports that his \"thoughts are illogical because there is no concrete evidence. \"  Reports having a negative view of the world. Denies any visual hallucinations. Reports a history of auditory hallucinations but thinks these may be related to drug use in the past which includes cocaine, marijuana, LSD, mushrooms. He also previously drank 12-15 drinks a week of alcohol. He has not had any alcohol or recreational drugs since October 2019. Reports that his depressive symptoms have gotten worse after stopping substance use. Denies any episodes of [place] but reports his mom says he is \"up-and-down\" a lot.     Patient interested in psychiatry counseling referral.  PHQ-9 score is 12. Recommend starting Lexapro for depression and anxiety. List of places provided to patient for psychiatry and counseling. Also recommend calling his insurance to get a list of covered providers. 1/22/21 - Was started on Lexapro at this visit. However, 2 days ago, he woke up with severe anxiety and fatigue and weakness. He went to the emergency room. Blood work at that time was negative. He stopped taking Lexapro after this visit. Symptoms have somewhat improved although \"I still feel good. \"  Denies any fever, chills, cough, shortness of breath, palpitations, abdominal pain, nausea, vomiting, diarrhea, constipation. Denies any new stressors. Sleeps about 10-12 hours at night and feels well rested. Felt worse on Lexapro.     Reports that he was previously on Wellbutrin and tolerated that medication well. He is interested in retrying this.   Denies a history of seizures or eating disorders.     We will start Wellbutrin 150 mg daily. Given information for online counseling. He is supposed to call [facility] again to see if they have any openings for appointments. 3/8/21 - Was admitted early February for suicidal ideation. Now following with [facility] services Dr. Shaista Jarquin. Now currently on Wellbutrin 300 mg daily and Latuda 20 mg daily. He had been on Abilify after his discharge last month but reports feeling restless and his arm felt weird. The symptoms have resolved after starting Latuda. Denies any current suicidal ideation. Reports feeling better since his admission although \"still do not feel great but I have more energy. \"     Do not recommend any changes to his medications. Continue Wellbutrin 300 daily and Latuda 20 mg daily    4/5/21 - Has been followed by [address] [place] [address]. Latuda increase since last visit to 40 mg. Also on Wellbutrin 300 mg daily. Reports that his past week has been better but \"still not super great. \"  Interested in exploring other options for psychiatry and therapy. Options sent through my chart. There are also considering starting him on Adderall 10 mg twice a day for his ADHD. No medication changes recommended. Follow-up with psychiatry.  =============================    Since last visit, he stopped his Latuda because it made him feel restless. Continues to have depression. Denies any suicidal ideation. He is interested in switching psychiatrists. Continue Wellbutrin 300 mg daily. Recommend starting Seroquel 25 mg at night to help both with sleep and depression.   Information for other psych offices provided

## 2021-09-15 NOTE — ASSESSMENT & PLAN NOTE
Reports noting worsening shortness of breath with activity and feels like his asthma is not as well controlled. Uses albuterol prior to activity.   Recommend starting Symbicort and using albuterol as needed

## 2021-09-15 NOTE — ASSESSMENT & PLAN NOTE
1/4/21 - Diagnosed as a child. Not currently on any medications. Continue to monitor     4/5/21 - started on adderall by psych  =============  Reports taking Adderall 20 mg in the morning and 10 mg in the afternoon. He will sometimes skip his afternoon dose. Does notice improvement when he is taking the medication.   Recommend stopping Adderall temporarily to see if that is causing any sleep issues

## 2021-09-15 NOTE — ASSESSMENT & PLAN NOTE
1/4/21 - Was previously scheduled to have surgery for varicose vein of his left scrotum last year. However, this did not occur due to his moved from [address] [place] [address]. Occasionally still has pain in his left scrotum.   Referral provided for urology

## 2021-09-15 NOTE — ASSESSMENT & PLAN NOTE
Reports severe insomnia ongoing since the summer. Reports that he could go a couple days without sleeping. Does not feel tired. When he is able to fall asleep late at night, he ends up waking up early because of the noise in the house. Recommend starting Seroquel 25 mg at night to help both with sleep and his depression.   Advised to call if any issues prior to next visit

## 2021-09-15 NOTE — ASSESSMENT & PLAN NOTE
1/4/21 - Thinks that he already got the flu shot and Tdap vaccine.   He will check with his previous provider in [address] [place] [address]

## 2021-09-17 LAB
C TRACH RRNA SPEC QL NAA+PROBE: NEGATIVE
HBV CORE AB SERPL QL IA: NEGATIVE
HBV CORE IGM SERPL QL IA: NEGATIVE
HBV SURFACE AB SER QL: NON REACTIVE
HBV SURFACE AG SERPL QL IA: NEGATIVE
HCV AB S/CO SERPL IA: <0.1 S/CO RATIO (ref 0–0.9)
HIV 1+2 AB+HIV1 P24 AG SERPL QL IA: NON REACTIVE
N GONORRHOEA RRNA SPEC QL NAA+PROBE: NEGATIVE
T VAGINALIS DNA SPEC QL NAA+PROBE: NEGATIVE
TREPONEMA PALLIDUM IGG+IGM AB [PRESENCE] IN SERUM OR PLASMA BY IMMUNOASSAY: NON REACTIVE

## 2021-09-17 NOTE — PROGRESS NOTES
mychart message sent. Gonorrhea chlamydia trichomonas negative. Hep B negative. Hep B surface antibody negative. Syphilis negative. HIV negative. Hep C negative. STD testing negative. Not immune to hepatitis B.   Offer hep B vaccination

## 2022-03-18 PROBLEM — N50.812 PAIN IN LEFT TESTICLE: Status: ACTIVE | Noted: 2021-09-15

## 2022-03-19 PROBLEM — G47.00 INSOMNIA: Status: ACTIVE | Noted: 2021-09-15

## 2022-03-19 PROBLEM — F33.2 SEVERE EPISODE OF RECURRENT MAJOR DEPRESSIVE DISORDER, WITHOUT PSYCHOTIC FEATURES (HCC): Status: ACTIVE | Noted: 2021-01-04

## 2022-03-19 PROBLEM — Z00.00 ROUTINE ADULT HEALTH MAINTENANCE: Status: ACTIVE | Noted: 2021-09-15

## 2022-03-19 PROBLEM — D22.9 MULTIPLE NEVI: Status: ACTIVE | Noted: 2021-09-15

## 2022-04-20 DIAGNOSIS — F33.2 SEVERE EPISODE OF RECURRENT MAJOR DEPRESSIVE DISORDER, WITHOUT PSYCHOTIC FEATURES (HCC): ICD-10-CM

## 2022-04-20 RX ORDER — QUETIAPINE FUMARATE 25 MG/1
25 TABLET, FILM COATED ORAL
Qty: 30 TABLET | Refills: 1 | Status: SHIPPED | OUTPATIENT
Start: 2022-04-20 | End: 2022-04-28

## 2022-04-28 ENCOUNTER — OFFICE VISIT (OUTPATIENT)
Dept: INTERNAL MEDICINE CLINIC | Age: 27
End: 2022-04-28
Payer: MEDICAID

## 2022-04-28 VITALS
SYSTOLIC BLOOD PRESSURE: 119 MMHG | TEMPERATURE: 97.7 F | WEIGHT: 178 LBS | DIASTOLIC BLOOD PRESSURE: 74 MMHG | BODY MASS INDEX: 22.84 KG/M2 | RESPIRATION RATE: 14 BRPM | HEIGHT: 74 IN | OXYGEN SATURATION: 97 % | HEART RATE: 58 BPM

## 2022-04-28 DIAGNOSIS — J45.990 EXERCISE-INDUCED ASTHMA: ICD-10-CM

## 2022-04-28 DIAGNOSIS — Z11.3 SCREENING FOR STD (SEXUALLY TRANSMITTED DISEASE): ICD-10-CM

## 2022-04-28 DIAGNOSIS — Z00.00 ROUTINE ADULT HEALTH MAINTENANCE: ICD-10-CM

## 2022-04-28 DIAGNOSIS — F90.9 ATTENTION DEFICIT HYPERACTIVITY DISORDER (ADHD), UNSPECIFIED ADHD TYPE: ICD-10-CM

## 2022-04-28 DIAGNOSIS — N50.812 PAIN IN LEFT TESTICLE: ICD-10-CM

## 2022-04-28 DIAGNOSIS — F33.2 SEVERE EPISODE OF RECURRENT MAJOR DEPRESSIVE DISORDER, WITHOUT PSYCHOTIC FEATURES (HCC): ICD-10-CM

## 2022-04-28 DIAGNOSIS — K62.5 BRBPR (BRIGHT RED BLOOD PER RECTUM): Primary | ICD-10-CM

## 2022-04-28 DIAGNOSIS — G47.00 INSOMNIA, UNSPECIFIED TYPE: ICD-10-CM

## 2022-04-28 PROCEDURE — 99214 OFFICE O/P EST MOD 30 MIN: CPT | Performed by: INTERNAL MEDICINE

## 2022-04-28 RX ORDER — QUETIAPINE FUMARATE 100 MG/1
100 TABLET, FILM COATED ORAL
COMMUNITY

## 2022-04-28 RX ORDER — ALBUTEROL SULFATE 90 UG/1
2 AEROSOL, METERED RESPIRATORY (INHALATION)
Qty: 1 EACH | Refills: 11 | Status: SHIPPED | OUTPATIENT
Start: 2022-04-28 | End: 2022-05-04 | Stop reason: SDUPTHER

## 2022-04-28 NOTE — ASSESSMENT & PLAN NOTE
Psych scheduled 5/2/22  Still followed with [facility]   wellbutrin xl 300 daily   Seroquel 100  Advised that he should get his medications primarily from his psychiatrist but if he has issues getting the prescription, I can bridge him temporarily

## 2022-04-28 NOTE — ASSESSMENT & PLAN NOTE
Requesting STD testing. Provided verbal permission for HIV. Previous testing shows he was not immune to hepatitis B. He thinks he got the first shot already.   We will check VIIS

## 2022-04-28 NOTE — PROGRESS NOTES
Note   Chief Complaint   Medications    Dontae Toscano is a 32 y.o. male     1. BRBPR (bright red blood per rectum)  Assessment & Plan:  Constipation  Also noted BRBPR that he sees when he's really constipated - noted about every 3-4 bowel movements, sometimes more frequently  Diet hasn't changed  Drinks about a gallon of water a day   No diarrhea   +mucus   Reports occl sharp, quick pain in his rectum  Pt declined rectal exam today. GI referral provided   Orders:  -     REFERRAL TO GASTROENTEROLOGY  2. Exercise-induced asthma  Assessment & Plan:  Last visit we discussed starting Symbicort and using albuterol as needed. Felt Symbicort was not working well for him and has been using albuterol with exercise. Not using albuterol when he is not exercising  Continue albuterol as needed for exercise  Orders:  -     albuterol (PROVENTIL HFA, VENTOLIN HFA, PROAIR HFA) 90 mcg/actuation inhaler; Take 2 Puffs by inhalation every six (6) hours as needed for Wheezing or Shortness of Breath. Indications: asthma attack, Normal, Disp-1 Each, R-11  3. Screening for STD (sexually transmitted disease)  -     CT/NG/T.VAGINALIS AMPLIFICATION; Future  -     HIV 1/2 AG/AB, 4TH GENERATION,W RFLX CONFIRM; Future  -     T PALLIDUM SCREEN W/REFLEX; Future  -     HEPATITIS C AB; Future  -     HEP B SURFACE AG; Future  -     HBV CORE AB, IGG/IGM; Future  4. Attention deficit hyperactivity disorder (ADHD), unspecified ADHD type  Assessment & Plan:  Has been managed by his psychiatrist but sometimes has difficulty getting the prescriptions. Advised that I can help bridge prescriptions if needed but he should primarily get them from his psychiatrist..  Okay to continue  adderall 30 XR daily   adderall IR 10mg BID  5.  Severe episode of recurrent major depressive disorder, without psychotic features [facility])  Assessment & Plan:   Psych scheduled 5/2/22  Still followed with [facility] services   wellbutrin xl 300 daily   Seroquel 100  Advised that he should get his medications primarily from his psychiatrist but if he has issues getting the prescription, I can bridge him temporarily  6. Insomnia, unspecified type  Assessment & Plan:  Currently on Seroquel 100 mg at night, provided by his psychiatrist but I can cover if unable to get from his psychiatrist  7. Pain in left testicle  Assessment & Plan:  Has not been as much of an issue  8. Routine adult health maintenance  Assessment & Plan:  Requesting STD testing. Provided verbal permission for HIV. Previous testing shows he was not immune to hepatitis B. He thinks he got the first shot already. We will check VIIS       Benefits, risks, possible drug interactions, and side effects of all new medications were reviewed with the patient. Pt verbalized understanding. Return to clinic: 6 months for general follow-up or earlier if needed  [employer] - interested in user experience   Thinking about either going to [place] [id number] of [address] was used to authenticate this note. Kaci Morris MD  [facility]  4/28/2022    No future appointments. Objective   Vitals:       Visit Vitals  /74 (BP 1 Location: Left upper arm, BP Patient Position: Sitting, BP Cuff Size: Adult)   Pulse (!) 58   Temp 97.7 °F (36.5 °C) (Oral)   Resp 14   Ht 6' 2\" (1.88 m)   Wt 178 lb (80.7 kg)   SpO2 97%   BMI 22.85 kg/m²        Physical Exam  Constitutional:       Appearance: Normal appearance. He is not ill-appearing. Cardiovascular:      Rate and Rhythm: Normal rate and regular rhythm. Heart sounds: No murmur heard. No friction rub. No gallop. Pulmonary:      Effort: No respiratory distress. Breath sounds: Normal breath sounds. No wheezing, rhonchi or rales. Neurological:      Mental Status: He is alert.           Current Outpatient Medications   Medication Sig    QUEtiapine (SEROquel) 100 mg tablet Take 100 mg by mouth nightly.  albuterol (PROVENTIL HFA, VENTOLIN HFA, PROAIR HFA) 90 mcg/actuation inhaler Take 2 Puffs by inhalation every six (6) hours as needed for Wheezing or Shortness of Breath. Indications: asthma attack    dextroamphetamine-amphetamine (AdderalL) 10 mg tablet Take 1 Tablet by mouth three (3) times daily. Max Daily Amount: 30 mg.    dextroamphetamine-amphetamine (AdderalL) 10 mg tablet Take 1 Tablet by mouth three (3) times daily. Max Daily Amount: 30 mg.    dextroamphetamine-amphetamine (AdderalL) 10 mg tablet Take 1 Tablet by mouth three (3) times daily. Max Daily Amount: 30 mg.    buPROPion XL (WELLBUTRIN XL) 300 mg XL tablet Take 1 Tablet by mouth every morning. Indications: major depressive disorder    ibuprofen (MOTRIN) 200 mg tablet Take 400 mg by mouth every six (6) hours as needed for Pain.  melatonin 5 mg tablet Take 5 mg by mouth nightly as needed (insomnia). (Patient not taking: Reported on 4/28/2022)     No current facility-administered medications for this visit.

## 2022-04-28 NOTE — ASSESSMENT & PLAN NOTE
Has been managed by his psychiatrist but sometimes has difficulty getting the prescriptions.   Advised that I can help bridge prescriptions if needed but he should primarily get them from his psychiatrist..  Okay to continue  adderall 30 XR daily   adderall IR 10mg BID

## 2022-04-28 NOTE — ASSESSMENT & PLAN NOTE
Constipation  Also noted BRBPR that he sees when he's really constipated - noted about every 3-4 bowel movements, sometimes more frequently  Diet hasn't changed  Drinks about a gallon of water a day   No diarrhea   +mucus   Reports occl sharp, quick pain in his rectum  Pt declined rectal exam today.   GI referral provided

## 2022-04-28 NOTE — ASSESSMENT & PLAN NOTE
Currently on Seroquel 100 mg at night, provided by his psychiatrist but I can cover if unable to get from his psychiatrist

## 2022-04-28 NOTE — ASSESSMENT & PLAN NOTE
Last visit we discussed starting Symbicort and using albuterol as needed. Felt Symbicort was not working well for him and has been using albuterol with exercise.   Not using albuterol when he is not exercising  Continue albuterol as needed for exercise

## 2022-04-29 ENCOUNTER — TELEPHONE (OUTPATIENT)
Dept: INTERNAL MEDICINE CLINIC | Age: 27
End: 2022-04-29

## 2022-04-29 NOTE — TELEPHONE ENCOUNTER
Please advise patient that he is eligible for the full HBV vaccine series (3 shots). We can either do that in our office, or I can send prescriptions to the pharmacy. Which would he like to do?

## 2022-04-29 NOTE — TELEPHONE ENCOUNTER
Call made to patient to inform of vaccines needed for HBV. Patient will call back to schedule first vaccine. VIIS records emailed to patient as requested. Patient was thankful.

## 2022-04-29 NOTE — TELEPHONE ENCOUNTER
----- Message from Oran Ahumada, MD sent at 8/20/7505 10:32 AM EDT -----  Regarding: VIIS  Please check VIIS for HBV dates.  thanks

## 2022-05-02 LAB
C TRACH RRNA SPEC QL NAA+PROBE: NEGATIVE
HBV CORE AB SERPL QL IA: NEGATIVE
HBV CORE IGM SERPL QL IA: NEGATIVE
HBV SURFACE AG SERPL QL IA: NEGATIVE
HCV AB S/CO SERPL IA: 0.2 S/CO RATIO (ref 0–0.9)
HIV 1+2 AB+HIV1 P24 AG SERPL QL IA: NON REACTIVE
N GONORRHOEA RRNA SPEC QL NAA+PROBE: NEGATIVE
T VAGINALIS RRNA SPEC QL NAA+PROBE: NEGATIVE
TREPONEMA PALLIDUM IGG+IGM AB [PRESENCE] IN SERUM OR PLASMA BY IMMUNOASSAY: NON REACTIVE

## 2022-05-04 DIAGNOSIS — J45.990 EXERCISE-INDUCED ASTHMA: ICD-10-CM

## 2022-05-04 RX ORDER — ALBUTEROL SULFATE 90 UG/1
2 AEROSOL, METERED RESPIRATORY (INHALATION)
Qty: 1 EACH | Refills: 11 | Status: SHIPPED | OUTPATIENT
Start: 2022-05-04

## 2022-05-05 NOTE — PROGRESS NOTES
mychart message sent. Chlamydia, gonorrhea, trichomonas negative. Hep B core negative. Hep C negative. HIV negative. Syphilis negative.   Surface antigen negative    STD testing negative

## 2022-10-10 ENCOUNTER — OFFICE VISIT (OUTPATIENT)
Dept: INTERNAL MEDICINE CLINIC | Age: 27
End: 2022-10-10
Payer: MEDICAID

## 2022-10-10 VITALS
WEIGHT: 175.2 LBS | OXYGEN SATURATION: 98 % | SYSTOLIC BLOOD PRESSURE: 144 MMHG | RESPIRATION RATE: 15 BRPM | HEART RATE: 76 BPM | DIASTOLIC BLOOD PRESSURE: 89 MMHG | BODY MASS INDEX: 22.48 KG/M2 | HEIGHT: 74 IN | TEMPERATURE: 98.4 F

## 2022-10-10 DIAGNOSIS — R03.0 ELEVATED BLOOD PRESSURE READING WITHOUT DIAGNOSIS OF HYPERTENSION: ICD-10-CM

## 2022-10-10 DIAGNOSIS — Z01.84 IMMUNITY STATUS TESTING: Primary | ICD-10-CM

## 2022-10-10 DIAGNOSIS — Z23 ENCOUNTER FOR IMMUNIZATION: ICD-10-CM

## 2022-10-10 PROCEDURE — 99213 OFFICE O/P EST LOW 20 MIN: CPT | Performed by: NURSE PRACTITIONER

## 2022-10-10 PROCEDURE — 90715 TDAP VACCINE 7 YRS/> IM: CPT | Performed by: NURSE PRACTITIONER

## 2022-10-10 RX ORDER — DEXTROAMPHETAMINE SACCHARATE, AMPHETAMINE ASPARTATE, DEXTROAMPHETAMINE SULFATE AND AMPHETAMINE SULFATE 5; 5; 5; 5 MG/1; MG/1; MG/1; MG/1
20 TABLET ORAL
COMMUNITY

## 2022-10-10 RX ORDER — ESCITALOPRAM OXALATE 10 MG/1
10 TABLET ORAL DAILY
COMMUNITY

## 2022-10-10 RX ORDER — DEXTROAMPHETAMINE SACCHARATE, AMPHETAMINE ASPARTATE MONOHYDRATE, DEXTROAMPHETAMINE SULFATE AND AMPHETAMINE SULFATE 7.5; 7.5; 7.5; 7.5 MG/1; MG/1; MG/1; MG/1
30 CAPSULE, EXTENDED RELEASE ORAL
COMMUNITY

## 2022-10-10 RX ORDER — DIAZEPAM ORAL 5 MG/5ML
2 SOLUTION ORAL
COMMUNITY

## 2022-10-10 NOTE — PROGRESS NOTES
Madhavi Prieto (: 1995) is a 32 y.o. male, established patient, here for evaluation of the following chief complaint(s):  Immunization/Injection (Measles, mumps, rubella, tetanus, hep B)       ASSESSMENT/PLAN:  Below is the assessment and plan developed based on review of pertinent history, physical exam, labs, studies, and medications. 1. Immunity status testing --previous vaccination records unavailable at this visit; will check titers for hepatitis B and MMR and address accordingly  -     HEP B SURFACE AB; Future  -     MEASLES/MUMPS/RUBELLA IMMUNITY; Future    2. Encounter for immunization --Tdap given in office today  -     TDAP, BOOSTRIX, (AGE 10 YRS+), IM  -     MD IMMUNIZ ADMIN,1 SINGLE/COMB VAC/TOXOID    3. Elevated blood pressure, without diagnosis of hypertension --we will have him check blood pressures at home and report readings to office. SUBJECTIVE/OBJECTIVE:  HPI  Patient of Stewart Cordero presents to discuss vaccinations needed for him to register for spring classes at [employer]. Apparently he has been notified that he is deficient in Tdap, hepatitis B and MMR. He is currently attending classes at [employer] but is unable to register for his spring classes until this requirement is refilled. Reports that he thinks he has had 2 hepatitis B vaccines in the past but is unable to produce any records. Unsure of when his last Tdap was but he thinks has been many years. Blood pressure slightly elevated in office today; advised to check readings at home and report results to office.   Patient Active Problem List   Diagnosis Code    Severe episode of recurrent major depressive disorder, without psychotic features ([facility] [address] F33.2    ADHD F90.9    Exercise-induced asthma J45.990    Insomnia G47.00    Multiple nevi D22.9    Routine adult health maintenance Z00.00    Pain in left testicle N50.812    BRBPR (bright red blood per rectum) K62.5     Past Surgical History:   Procedure Laterality Date    HX ORTHOPAEDIC      right wrist repair (tendon or ligament?)     Social History     Socioeconomic History    Marital status: SINGLE     Spouse name: Not on file    Number of children: Not on file    Years of education: Not on file    Highest education level: Not on file   Occupational History    Not on file   Tobacco Use    Smoking status: Former    Smokeless tobacco: Never    Tobacco comments:     1ppd from age [age], quit in 2020   Vaping Use    Vaping Use: Never used   Substance and Sexual Activity    Alcohol use: Not Currently     Comment: none since 10/2019; 12-15 drinks/week previously    Drug use: Yes     Types: Marijuana, Cocaine, Other     Comment: LSD also, mushrooms, none since 10/2019    Sexual activity: Not Currently     Partners: Female   Other Topics Concern    Not on file   Social History Narrative    Not on file     Social Determinants of Health     Financial Resource Strain: Not on file   Food Insecurity: Not on file   Transportation Needs: Not on file   Physical Activity: Not on file   Stress: Not on file   Social Connections: Not on file   Intimate Partner Violence: Not on file   Housing Stability: Not on file     Family History   Problem Relation Age of Onset    No Known Problems Mother     No Known Problems Father      Current Outpatient Medications   Medication Sig    escitalopram oxalate (LEXAPRO) 10 mg tablet Take 10 mg by mouth daily. Taking once in the morning    diazePAM (VALIUM) 5 mg/5 mL (1 mg/mL) oral solution Take 2 mg by mouth two (2) times daily as needed.  amphetamine-dextroamphetamine XR (Adderall XR) 30 mg XR capsule Take 30 mg by mouth every morning.  dextroamphetamine-amphetamine (AdderalL) 20 mg tablet Take 20 mg by mouth. In afternoon    albuterol (PROVENTIL HFA, VENTOLIN HFA, PROAIR HFA) 90 mcg/actuation inhaler Take 2 Puffs by inhalation every six (6) hours as needed for Wheezing or Shortness of Breath.  Indications: asthma attack    QUEtiapine (SEROquel) 100 mg tablet Take 100 mg by mouth nightly.  buPROPion XL (WELLBUTRIN XL) 300 mg XL tablet Take 1 Tablet by mouth every morning. Indications: major depressive disorder (Patient taking differently: Take 75 mg by mouth every morning. Indications: major depressive disorder)     No current facility-administered medications for this visit. Allergies   Allergen Reactions    Oxycodone Nausea and Vomiting     Immunization History   Administered Date(s) Administered    COVID-19, MODERNA Booster BLUE border, (age 18y+), IM, 50mcg/0.25mL 11/24/2021    COVID-19, PFIZER PURPLE top, DILUTE for use, (age 15 y+), IM, 30mcg/0.3mL 04/03/2021, 04/24/2021    Hep B Vaccine 1995    Tdap 10/10/2022          Review of Systems   Constitutional:  Negative for chills and fever. HENT:  Negative for congestion and postnasal drip. Respiratory:  Negative for cough. Neurological:  Negative for headaches. BP (!) 144/89 (BP 1 Location: Left upper arm, BP Patient Position: Sitting, BP Cuff Size: Adult)   Pulse 76   Temp 98.4 °F (36.9 °C) (Oral)   Resp 15   Ht 6' 2\" (1.88 m)   Wt 175 lb 3.2 oz (79.5 kg)   SpO2 98%   BMI 22.49 kg/m²  Physical Exam  Vitals and nursing note reviewed. Constitutional:       Appearance: Normal appearance. HENT:      Head: Normocephalic. Pulmonary:      Effort: Pulmonary effort is normal.   Musculoskeletal:         General: Normal range of motion. Neurological:      General: No focal deficit present. Mental Status: He is alert and oriented to person, place, and time. Psychiatric:         Mood and Affect: Mood normal.         Behavior: Behavior normal.             An electronic signature was used to authenticate this note.   -- Meron Simmons NP

## 2022-10-10 NOTE — PROGRESS NOTES
Patient present for routine immunizations. Pt denies any symptoms , reactions or allergies that would exclude them from being immunized today. Risks and adverse reactions were discussed and the VIS was given to them. All questions were addressed. Pt was observed for 10 min post injection. There were no reactions observed. Pt had questions regarding his blood pressure, he asked that I re-check it prior to him leaving. Pt states when he was younger he used to have to monitor his BP at home. I rechecked pts BP manually left arm, sitting, small adult cuff 140/90. Pt had questions re: blood pressure in general. I advised that its not uncommon for pts to have white coat syndrome, where there BP is elevated at the doctors office due to nervousness etc. I advise that pt start to re-check his BP at home and keep a long. Pt needs to make a f/up appt with PCP and bring his BP log and home BP machine. Pt will purchase a machine. I recommended the arm cuff not the wrist cuff. Pt was thankful for my help.      Linda Buchanan LPN

## 2022-10-24 ENCOUNTER — OFFICE VISIT (OUTPATIENT)
Dept: INTERNAL MEDICINE CLINIC | Age: 27
End: 2022-10-24
Payer: MEDICAID

## 2022-10-24 VITALS
WEIGHT: 177.4 LBS | OXYGEN SATURATION: 98 % | HEIGHT: 74 IN | BODY MASS INDEX: 22.77 KG/M2 | HEART RATE: 58 BPM | TEMPERATURE: 97.4 F | DIASTOLIC BLOOD PRESSURE: 92 MMHG | RESPIRATION RATE: 14 BRPM | SYSTOLIC BLOOD PRESSURE: 136 MMHG

## 2022-10-24 DIAGNOSIS — R03.0 ELEVATED BLOOD PRESSURE READING: ICD-10-CM

## 2022-10-24 DIAGNOSIS — J45.990 EXERCISE-INDUCED ASTHMA: Primary | ICD-10-CM

## 2022-10-24 DIAGNOSIS — Z01.84 IMMUNITY STATUS TESTING: ICD-10-CM

## 2022-10-24 DIAGNOSIS — K62.5 BRBPR (BRIGHT RED BLOOD PER RECTUM): ICD-10-CM

## 2022-10-24 DIAGNOSIS — Z11.3 SCREEN FOR STD (SEXUALLY TRANSMITTED DISEASE): ICD-10-CM

## 2022-10-24 PROBLEM — F41.9 ANXIETY: Status: RESOLVED | Noted: 2021-01-04 | Resolved: 2021-02-04

## 2022-10-24 PROCEDURE — 99214 OFFICE O/P EST MOD 30 MIN: CPT | Performed by: INTERNAL MEDICINE

## 2022-10-24 RX ORDER — BUDESONIDE AND FORMOTEROL FUMARATE DIHYDRATE 80; 4.5 UG/1; UG/1
2 AEROSOL RESPIRATORY (INHALATION)
COMMUNITY

## 2022-10-24 NOTE — PROGRESS NOTES
Note   Chief Complaint   Asthma, testing    Steph Stout is a 32 y.o. male     1. Exercise-induced asthma  Assessment & Plan:   Wanted peak flow done   Skates a lot and feels like he should be able to breathe better  Thought albuterol helped but then he felt like maybe that made it worse - was feeling really short of breath   Would use albuterol before and during his activity  Switched to symbicort - maybe doing better   SOB mostly just with high intensity exercise, feels like can't take deeper breaths  Rec pulm referral for PFTs  Orders:  -     REFERRAL TO PULMONARY DISEASE  2. Screen for STD (sexually transmitted disease)  Assessment & Plan:  Pt requesting std testing, no known exposures, provided verbal permission for HIV testing  Orders:  -     CT/NG/T.VAGINALIS AMPLIFICATION; Future  -     HIV 1/2 AG/AB, 4TH GENERATION,W RFLX CONFIRM; Future  -     T PALLIDUM SCREEN W/REFLEX; Future  -     HEPATITIS C AB; Future  -     HEP B SURFACE AG; Future  -     HBV CORE AB, IGG/IGM; Future  -     HEP B SURFACE AB; Future  -     HSV 1 AND 2-SPEC AB, IGG W/RFX TO SUPPL HSV-2 TESTING; Future  3. Immunity status testing  Assessment & Plan:   May need MMR and HBV for school - check for immunity; previous check for immunizations found 1 HBV vaccine in 1995  Orders:  -     MEASLES/MUMPS/RUBELLA IMMUNITY; Future  4. BRBPR (bright red blood per rectum)  Assessment & Plan:   Hasn't really had this issue since last discussed. Given info for GI again  5. Elevated blood pressure reading  Assessment & Plan:   Elevated on arrival, systolic better on repeat. Doesn't do a lot of salt in his diet. On adderall  rec checking outside of the office to see if white coat      Benefits, risks, possible drug interactions, and side effects of all new medications were reviewed with the patient. Pt verbalized understanding.     Return to clinic:  as needed  [facility], [place], interested in psych     An electronic signature was used to authenticate this note. Sulema Avila MD  [facility]  10/24/2022    No future appointments. Objective   Vitals:       Visit Vitals  BP (!) 136/92   Pulse (!) 58   Temp 97.4 °F (36.3 °C) (Oral)   Resp 14   Ht 6' 2\" (1.88 m)   Wt 177 lb 6.4 oz (80.5 kg)   SpO2 98%   BMI 22.78 kg/m²        Physical Exam  Constitutional:       Appearance: Normal appearance. He is not ill-appearing. Cardiovascular:      Rate and Rhythm: Normal rate and regular rhythm. Heart sounds: No murmur heard. No friction rub. No gallop. Pulmonary:      Effort: No respiratory distress. Breath sounds: Normal breath sounds. No wheezing, rhonchi or rales. Neurological:      Mental Status: He is alert. Current Outpatient Medications   Medication Sig    budesonide-formoteroL (Symbicort) 80-4.5 mcg/actuation HFAA Take 2 Puffs by inhalation. escitalopram oxalate (LEXAPRO) 10 mg tablet Take 10 mg by mouth daily. Taking once in the morning    diazePAM (VALIUM) 5 mg/5 mL (1 mg/mL) oral solution Take 2 mg by mouth two (2) times daily as needed. amphetamine-dextroamphetamine XR (ADDERALL XR) 30 mg XR capsule Take 30 mg by mouth every morning. dextroamphetamine-amphetamine (ADDERALL) 20 mg tablet Take 20 mg by mouth. In afternoon    QUEtiapine (SEROquel) 100 mg tablet Take 100 mg by mouth nightly. buPROPion XL (WELLBUTRIN XL) 300 mg XL tablet Take 1 Tablet by mouth every morning. Indications: major depressive disorder (Patient taking differently: Take 75 mg by mouth every morning. Indications: major depressive disorder)    albuterol (PROVENTIL HFA, VENTOLIN HFA, PROAIR HFA) 90 mcg/actuation inhaler Take 2 Puffs by inhalation every six (6) hours as needed for Wheezing or Shortness of Breath. Indications: asthma attack (Patient not taking: Reported on 10/24/2022)     No current facility-administered medications for this visit.

## 2022-10-24 NOTE — ASSESSMENT & PLAN NOTE
Elevated on arrival, systolic better on repeat. Doesn't do a lot of salt in his diet.  On adderall  rec checking outside of the office to see if white coat

## 2022-10-24 NOTE — ASSESSMENT & PLAN NOTE
Wanted peak flow done   Skates a lot and feels like he should be able to breathe better  Thought albuterol helped but then he felt like maybe that made it worse - was feeling really short of breath   Would use albuterol before and during his activity  Switched to symbicort - maybe doing better   SOB mostly just with high intensity exercise, feels like can't take deeper breaths  Rec pulm referral for PFTs

## 2022-10-24 NOTE — ASSESSMENT & PLAN NOTE
May need MMR and HBV for school - check for immunity; previous check for immunizations found 1 HBV vaccine in 1995

## 2022-11-07 ENCOUNTER — TELEPHONE (OUTPATIENT)
Dept: INTERNAL MEDICINE CLINIC | Age: 27
End: 2022-11-07

## 2022-11-07 NOTE — TELEPHONE ENCOUNTER
Patient called to state he would like to have new orders placed to do his labs at the [facility] at [address]. There fax number is [phone]. Patient states he saw his PCP on 10/24 but the lab orders he was given were set to  that same day and he was not able to have his labs done that day.  Patient would like a call back to let him know when these lab orders have been faxed to [facility].

## 2022-11-07 NOTE — TELEPHONE ENCOUNTER
Called patient and verified name and date of birth. Pt found expiration date is one year from last visit. No further problems.

## 2022-11-08 NOTE — DISCHARGE INSTRUCTIONS
PLEASE GET IN TOUCH WITH PSYCHIATRY TOMORROW AS PLANNED    FOLLOW UP WITH YOUR PRIMARY DOCTOR REGARDING YOUR MEDICATIONS    RETURN IF WORSENING DEPRESSION, WORSENING FATIGUE, OR THOUGHTS OF HARMING SELF OR OTHERS.
There are no Wet Read(s) to document.

## 2022-11-11 LAB
C TRACH RRNA SPEC QL NAA+PROBE: NEGATIVE
HBV CORE AB SERPL QL IA: NEGATIVE
HBV CORE IGM SERPL QL IA: NEGATIVE
HBV SURFACE AB SER QL: NON REACTIVE
HBV SURFACE AG SERPL QL IA: NEGATIVE
HCV AB S/CO SERPL IA: <0.1 S/CO RATIO (ref 0–0.9)
HIV 1+2 AB+HIV1 P24 AG SERPL QL IA: NON REACTIVE
HSV1 IGG SER IA-ACNC: <0.91 INDEX (ref 0–0.9)
HSV2 IGG SER IA-ACNC: <0.91 INDEX (ref 0–0.9)
MEV IGG SER IA-ACNC: 214 AU/ML
MUV IGG SER IA-ACNC: 108 AU/ML
N GONORRHOEA RRNA SPEC QL NAA+PROBE: NEGATIVE
RUBV IGG SERPL IA-ACNC: 7.24 INDEX
T VAGINALIS RRNA SPEC QL NAA+PROBE: NEGATIVE
TREPONEMA PALLIDUM IGG+IGM AB [PRESENCE] IN SERUM OR PLASMA BY IMMUNOASSAY: NON REACTIVE

## 2022-11-23 PROBLEM — Z11.3 SCREEN FOR STD (SEXUALLY TRANSMITTED DISEASE): Status: RESOLVED | Noted: 2022-10-24 | Resolved: 2022-11-23

## 2022-11-23 PROBLEM — Z01.84 IMMUNITY STATUS TESTING: Status: RESOLVED | Noted: 2022-10-24 | Resolved: 2022-11-23

## 2023-03-15 ENCOUNTER — VIRTUAL VISIT (OUTPATIENT)
Dept: INTERNAL MEDICINE CLINIC | Age: 28
End: 2023-03-15
Payer: MEDICAID

## 2023-03-15 ENCOUNTER — TELEPHONE (OUTPATIENT)
Dept: INTERNAL MEDICINE CLINIC | Age: 28
End: 2023-03-15

## 2023-03-15 DIAGNOSIS — J02.9 PHARYNGITIS, UNSPECIFIED ETIOLOGY: Primary | ICD-10-CM

## 2023-03-15 DIAGNOSIS — Z30.09 VASECTOMY EVALUATION: ICD-10-CM

## 2023-03-15 PROCEDURE — 99213 OFFICE O/P EST LOW 20 MIN: CPT | Performed by: INTERNAL MEDICINE

## 2023-03-15 RX ORDER — AMOXICILLIN 500 MG/1
500 CAPSULE ORAL 2 TIMES DAILY
Qty: 20 CAPSULE | Refills: 0 | Status: SHIPPED | OUTPATIENT
Start: 2023-03-15 | End: 2023-03-25

## 2023-03-15 RX ORDER — MULTIVITAMIN
1 TABLET ORAL DAILY
COMMUNITY

## 2023-03-15 NOTE — TELEPHONE ENCOUNTER
----- Message from Isaiah Carreno sent at 3/15/2023  2:09 PM EDT -----  Subject: Message to Provider    QUESTIONS  Information for Provider? Patient is wanting to know if he can get some   Tamiflu sent into the pharmacy for him. His temp is 100.5. Headache , body   ache, sore throat. He did take 2 Covid test and they were both negative.   ---------------------------------------------------------------------------  --------------  Yoselin HUGGINS  [phone]; OK to leave message on voicemail  ---------------------------------------------------------------------------  --------------  SCRIPT ANSWERS  Relationship to Patient?  Self

## 2023-03-15 NOTE — TELEPHONE ENCOUNTER
Nurse called patient patient s started yesterday with headache body aches, and sore throat last tested for covid today results was negative. Patient was scheduled for vv for today. Patient thankful.

## 2023-03-15 NOTE — PROGRESS NOTES
Consent: Caitlyn Hess, who was seen by synchronous (real-time) audio-video technology, and/or his healthcare decision maker, is aware that this patient-initiated, Telehealth encounter on 3/15/2023 is a billable service, with coverage as determined by his insurance carrier. He is aware that he may receive a bill and has provided verbal consent to proceed: Yes. I was in the office while conducting this encounter. Patient identification was verified at the start of the visit: YES  This visit was done with doxy. me  The patient was located at home in a state where the provider was licensed to provide care. Patient was located at HOME      Note   Chief Complaint   Sore throat    Caitlyn Hess is a 29 y.o. male     1. Pharyngitis, unspecified etiology  -     amoxicillin (AMOXIL) 500 mg capsule; Take 1 Capsule by mouth two (2) times a day for 10 days. , Normal, Disp-20 Capsule, R-0  2. Vasectomy evaluation  -     REFERRAL TO UROLOGY   Recently got back from spring break  Yesterday developed temp 100.5, severe HA, body aches, tender cervical lymph nodes, and severe sore throat. No sinus congestion/drainage, no cough or SOB. Evaluation limited by VV. Rec amoxillin to cover strep. Rec rechecking covid test in 2 days. Centor score of 3 (maybe 4 - pt reports questionable exudate on left tonsil). Call if no improvement. Pt also interested in vasectomy evaluation. Referral provided. Advised that he should approach this procedure as irreversible and should only be undertaken if he truly does not believe he will want children in the future    We discussed the expected course, resolution and complications of the diagnosis(es) in detail. Medication risks, benefits, costs, interactions, and alternatives were discussed as indicated. I advised him to contact the office if his condition worsens, changes or fails to improve as anticipated. He expressed understanding with the diagnosis(es) and plan.      Return to clinic: as needed    Bill Weinstein MD  [facility]  3/15/2023    No future appointments. Objective   Vitals:       Patient-Reported Vitals 3/15/2023   Patient-Reported Weight 174   Patient-Reported Temperature 100.00                 Physical Exam  Constitutional:       Comments: Appears tired   Pulmonary:      Effort: No respiratory distress. Neurological:      Mental Status: He is alert. Current Outpatient Medications   Medication Sig    multivitamin (ONE A DAY) tablet Take 1 Tablet by mouth daily. amoxicillin (AMOXIL) 500 mg capsule Take 1 Capsule by mouth two (2) times a day for 10 days. budesonide-formoteroL (SYMBICORT) 80-4.5 mcg/actuation HFAA Take 2 Puffs by inhalation. escitalopram oxalate (LEXAPRO) 10 mg tablet Take 10 mg by mouth daily. Taking once in the morning    diazePAM (VALIUM) 5 mg/5 mL (1 mg/mL) oral solution Take 2 mg by mouth two (2) times daily as needed. amphetamine-dextroamphetamine XR (ADDERALL XR) 30 mg XR capsule Take 30 mg by mouth every morning. dextroamphetamine-amphetamine (ADDERALL) 20 mg tablet Take 20 mg by mouth. In afternoon    QUEtiapine (SEROquel) 100 mg tablet Take 100 mg by mouth nightly. buPROPion XL (WELLBUTRIN XL) 300 mg XL tablet Take 1 Tablet by mouth every morning. Indications: major depressive disorder (Patient taking differently: Take 75 mg by mouth every morning. Indications: major depressive disorder)    albuterol (PROVENTIL HFA, VENTOLIN HFA, PROAIR HFA) 90 mcg/actuation inhaler Take 2 Puffs by inhalation every six (6) hours as needed for Wheezing or Shortness of Breath. Indications: asthma attack (Patient not taking: No sig reported)     No current facility-administered medications for this visit. Tamara Hilario is a 29 y.o. male being evaluated by a video visit encounter for concerns as above. A caregiver was present when appropriate.  Due to this being a TeleHealth encounter (During COVID-19 public health emergency), evaluation of the following organ systems was limited: Vitals/Constitutional/EENT/Resp/CV/GI//MS/Neuro/Skin/Heme-Lymph-Imm. Pursuant to the emergency declaration under the [address] [place] [address], [facility] waiver authority and the [employer] Act, this Virtual  Visit was conducted, with patient's (and/or legal guardian's) consent, to reduce the patient's risk of exposure to COVID-19 and provide necessary medical care. Services were provided through a video synchronous discussion virtually to substitute for in-person clinic visit.